# Patient Record
Sex: FEMALE | Race: BLACK OR AFRICAN AMERICAN | NOT HISPANIC OR LATINO
[De-identification: names, ages, dates, MRNs, and addresses within clinical notes are randomized per-mention and may not be internally consistent; named-entity substitution may affect disease eponyms.]

---

## 2017-09-05 ENCOUNTER — APPOINTMENT (OUTPATIENT)
Dept: OBGYN | Facility: CLINIC | Age: 22
End: 2017-09-05

## 2017-09-12 ENCOUNTER — APPOINTMENT (OUTPATIENT)
Dept: OBGYN | Facility: CLINIC | Age: 22
End: 2017-09-12

## 2017-11-14 ENCOUNTER — APPOINTMENT (OUTPATIENT)
Dept: FAMILY MEDICINE | Facility: CLINIC | Age: 22
End: 2017-11-14

## 2018-01-25 ENCOUNTER — APPOINTMENT (OUTPATIENT)
Dept: FAMILY MEDICINE | Facility: CLINIC | Age: 23
End: 2018-01-25
Payer: MEDICAID

## 2018-01-25 VITALS
SYSTOLIC BLOOD PRESSURE: 110 MMHG | OXYGEN SATURATION: 99 % | DIASTOLIC BLOOD PRESSURE: 74 MMHG | HEART RATE: 97 BPM | HEIGHT: 71 IN | RESPIRATION RATE: 16 BRPM | BODY MASS INDEX: 41.02 KG/M2 | WEIGHT: 293 LBS

## 2018-01-25 DIAGNOSIS — M54.9 DORSALGIA, UNSPECIFIED: ICD-10-CM

## 2018-01-25 DIAGNOSIS — M25.531 PAIN IN RIGHT WRIST: ICD-10-CM

## 2018-01-25 DIAGNOSIS — G89.29 DORSALGIA, UNSPECIFIED: ICD-10-CM

## 2018-01-25 DIAGNOSIS — H61.22 IMPACTED CERUMEN, LEFT EAR: ICD-10-CM

## 2018-01-25 DIAGNOSIS — Z23 ENCOUNTER FOR IMMUNIZATION: ICD-10-CM

## 2018-01-25 DIAGNOSIS — M79.642 PAIN IN RIGHT HAND: ICD-10-CM

## 2018-01-25 DIAGNOSIS — M79.641 PAIN IN RIGHT HAND: ICD-10-CM

## 2018-01-25 DIAGNOSIS — M25.532 PAIN IN RIGHT WRIST: ICD-10-CM

## 2018-01-25 PROCEDURE — 99385 PREV VISIT NEW AGE 18-39: CPT | Mod: 25

## 2018-01-25 PROCEDURE — 90715 TDAP VACCINE 7 YRS/> IM: CPT

## 2018-01-25 PROCEDURE — 69209 REMOVE IMPACTED EAR WAX UNI: CPT | Mod: LT

## 2018-01-25 PROCEDURE — 90471 IMMUNIZATION ADMIN: CPT

## 2018-01-25 PROCEDURE — G0447 BEHAVIOR COUNSEL OBESITY 15M: CPT

## 2018-03-05 DIAGNOSIS — R76.8 OTHER SPECIFIED ABNORMAL IMMUNOLOGICAL FINDINGS IN SERUM: ICD-10-CM

## 2018-03-05 LAB
ALBUMIN SERPL ELPH-MCNC: 4.2 G/DL
ALP BLD-CCNC: 94 U/L
ALT SERPL-CCNC: 6 U/L
ANA PAT FLD IF-IMP: ABNORMAL
ANA SER IF-ACNC: ABNORMAL
ANION GAP SERPL CALC-SCNC: 12 MMOL/L
APPEARANCE: CLEAR
AST SERPL-CCNC: 21 U/L
BACTERIA: NEGATIVE
BASOPHILS # BLD AUTO: 0.02 K/UL
BASOPHILS NFR BLD AUTO: 0.4 %
BILIRUB SERPL-MCNC: 0.3 MG/DL
BILIRUBIN URINE: NEGATIVE
BLOOD URINE: NEGATIVE
BUN SERPL-MCNC: 10 MG/DL
CALCIUM SERPL-MCNC: 9.8 MG/DL
CCP AB SER IA-ACNC: <8 UNITS
CHLORIDE SERPL-SCNC: 102 MMOL/L
CHOLEST SERPL-MCNC: 154 MG/DL
CHOLEST/HDLC SERPL: 3.5 RATIO
CO2 SERPL-SCNC: 23 MMOL/L
COLOR: YELLOW
CREAT SERPL-MCNC: 0.92 MG/DL
EOSINOPHIL # BLD AUTO: 0.03 K/UL
EOSINOPHIL NFR BLD AUTO: 0.6 %
GLUCOSE QUALITATIVE U: NEGATIVE MG/DL
GLUCOSE SERPL-MCNC: 84 MG/DL
HBA1C MFR BLD HPLC: 5.2 %
HCT VFR BLD CALC: 37.3 %
HDLC SERPL-MCNC: 44 MG/DL
HGB BLD-MCNC: 11.8 G/DL
HYALINE CASTS: 0 /LPF
IMM GRANULOCYTES NFR BLD AUTO: 0 %
KETONES URINE: NEGATIVE
LDLC SERPL CALC-MCNC: 96 MG/DL
LEUKOCYTE ESTERASE URINE: NEGATIVE
LYMPHOCYTES # BLD AUTO: 1.95 K/UL
LYMPHOCYTES NFR BLD AUTO: 40.4 %
MAN DIFF?: NORMAL
MCHC RBC-ENTMCNC: 28 PG
MCHC RBC-ENTMCNC: 31.6 GM/DL
MCV RBC AUTO: 88.4 FL
MICROSCOPIC-UA: NORMAL
MONOCYTES # BLD AUTO: 0.38 K/UL
MONOCYTES NFR BLD AUTO: 7.9 %
NEUTROPHILS # BLD AUTO: 2.45 K/UL
NEUTROPHILS NFR BLD AUTO: 50.7 %
NITRITE URINE: NEGATIVE
PH URINE: 6
PLATELET # BLD AUTO: 358 K/UL
POTASSIUM SERPL-SCNC: 4.4 MMOL/L
PROT SERPL-MCNC: 8.6 G/DL
PROTEIN URINE: NEGATIVE MG/DL
RBC # BLD: 4.22 M/UL
RBC # FLD: 15.6 %
RED BLOOD CELLS URINE: 3 /HPF
RF+CCP IGG SER-IMP: NEGATIVE
RHEUMATOID FACT SER QL: <7 IU/ML
SODIUM SERPL-SCNC: 137 MMOL/L
SPECIFIC GRAVITY URINE: 1.02
SQUAMOUS EPITHELIAL CELLS: 3 /HPF
T4 FREE SERPL-MCNC: 1 NG/DL
TRIGL SERPL-MCNC: 71 MG/DL
TSH SERPL-ACNC: 3.51 UIU/ML
UROBILINOGEN URINE: NEGATIVE MG/DL
WBC # FLD AUTO: 4.83 K/UL
WHITE BLOOD CELLS URINE: 2 /HPF

## 2018-04-23 ENCOUNTER — NON-APPOINTMENT (OUTPATIENT)
Age: 23
End: 2018-04-23

## 2018-04-23 ENCOUNTER — APPOINTMENT (OUTPATIENT)
Dept: FAMILY MEDICINE | Facility: CLINIC | Age: 23
End: 2018-04-23
Payer: MEDICAID

## 2018-04-23 VITALS
WEIGHT: 293 LBS | DIASTOLIC BLOOD PRESSURE: 80 MMHG | BODY MASS INDEX: 41.02 KG/M2 | SYSTOLIC BLOOD PRESSURE: 120 MMHG | HEIGHT: 71 IN

## 2018-04-23 DIAGNOSIS — M79.671 PAIN IN RIGHT LEG: ICD-10-CM

## 2018-04-23 DIAGNOSIS — M79.605 PAIN IN RIGHT LEG: ICD-10-CM

## 2018-04-23 DIAGNOSIS — M79.672 PAIN IN RIGHT LEG: ICD-10-CM

## 2018-04-23 DIAGNOSIS — M79.604 PAIN IN RIGHT LEG: ICD-10-CM

## 2018-04-23 PROCEDURE — 99214 OFFICE O/P EST MOD 30 MIN: CPT | Mod: 25

## 2018-04-23 PROCEDURE — 93000 ELECTROCARDIOGRAM COMPLETE: CPT

## 2018-04-23 PROCEDURE — G0447 BEHAVIOR COUNSEL OBESITY 15M: CPT

## 2018-05-01 ENCOUNTER — APPOINTMENT (OUTPATIENT)
Dept: OBGYN | Facility: CLINIC | Age: 23
End: 2018-05-01
Payer: MEDICAID

## 2018-05-01 VITALS
DIASTOLIC BLOOD PRESSURE: 80 MMHG | HEIGHT: 71 IN | SYSTOLIC BLOOD PRESSURE: 118 MMHG | BODY MASS INDEX: 41.02 KG/M2 | WEIGHT: 293 LBS

## 2018-05-01 DIAGNOSIS — Z82.49 FAMILY HISTORY OF ISCHEMIC HEART DISEASE AND OTHER DISEASES OF THE CIRCULATORY SYSTEM: ICD-10-CM

## 2018-05-01 DIAGNOSIS — Z30.09 ENCOUNTER FOR OTHER GENERAL COUNSELING AND ADVICE ON CONTRACEPTION: ICD-10-CM

## 2018-05-01 DIAGNOSIS — Z86.79 PERSONAL HISTORY OF OTHER DISEASES OF THE CIRCULATORY SYSTEM: ICD-10-CM

## 2018-05-01 DIAGNOSIS — Z83.3 FAMILY HISTORY OF DIABETES MELLITUS: ICD-10-CM

## 2018-05-01 DIAGNOSIS — Z83.1 FAMILY HISTORY OF OTHER INFECTIOUS AND PARASITIC DISEASES: ICD-10-CM

## 2018-05-01 DIAGNOSIS — Z01.419 ENCOUNTER FOR GYNECOLOGICAL EXAMINATION (GENERAL) (ROUTINE) W/OUT ABNORMAL FINDINGS: ICD-10-CM

## 2018-05-01 LAB
HCG UR QL: NEGATIVE
QUALITY CONTROL: YES

## 2018-05-01 PROCEDURE — 81025 URINE PREGNANCY TEST: CPT

## 2018-05-01 PROCEDURE — 99385 PREV VISIT NEW AGE 18-39: CPT

## 2018-05-24 ENCOUNTER — APPOINTMENT (OUTPATIENT)
Dept: FAMILY MEDICINE | Facility: CLINIC | Age: 23
End: 2018-05-24

## 2018-05-24 ENCOUNTER — APPOINTMENT (OUTPATIENT)
Dept: RHEUMATOLOGY | Facility: CLINIC | Age: 23
End: 2018-05-24

## 2018-09-01 ENCOUNTER — OUTPATIENT (OUTPATIENT)
Dept: OUTPATIENT SERVICES | Facility: HOSPITAL | Age: 23
LOS: 1 days | End: 2018-09-01
Payer: MEDICAID

## 2018-09-12 ENCOUNTER — EMERGENCY (EMERGENCY)
Facility: HOSPITAL | Age: 23
LOS: 1 days | Discharge: HOME | End: 2018-09-12
Attending: EMERGENCY MEDICINE
Payer: COMMERCIAL

## 2018-09-12 VITALS
HEART RATE: 77 BPM | TEMPERATURE: 99 F | RESPIRATION RATE: 18 BRPM | OXYGEN SATURATION: 98 % | DIASTOLIC BLOOD PRESSURE: 74 MMHG | SYSTOLIC BLOOD PRESSURE: 110 MMHG

## 2018-09-12 VITALS
SYSTOLIC BLOOD PRESSURE: 151 MMHG | RESPIRATION RATE: 18 BRPM | OXYGEN SATURATION: 97 % | HEIGHT: 72 IN | WEIGHT: 293 LBS | DIASTOLIC BLOOD PRESSURE: 86 MMHG | TEMPERATURE: 99 F | HEART RATE: 82 BPM

## 2018-09-12 LAB
ALBUMIN SERPL ELPH-MCNC: 4 G/DL — SIGNIFICANT CHANGE UP (ref 3.3–5.2)
ALP SERPL-CCNC: 78 U/L — SIGNIFICANT CHANGE UP (ref 40–120)
ALT FLD-CCNC: 15 U/L — SIGNIFICANT CHANGE UP
ANION GAP SERPL CALC-SCNC: 12 MMOL/L — SIGNIFICANT CHANGE UP (ref 5–17)
APPEARANCE UR: ABNORMAL
APTT BLD: 25.7 SEC — LOW (ref 27.5–37.4)
AST SERPL-CCNC: 17 U/L — SIGNIFICANT CHANGE UP
BACTERIA # UR AUTO: ABNORMAL
BASOPHILS # BLD AUTO: 0 K/UL — SIGNIFICANT CHANGE UP (ref 0–0.2)
BASOPHILS NFR BLD AUTO: 0.1 % — SIGNIFICANT CHANGE UP (ref 0–2)
BILIRUB SERPL-MCNC: 0.2 MG/DL — LOW (ref 0.4–2)
BILIRUB UR-MCNC: NEGATIVE — SIGNIFICANT CHANGE UP
BUN SERPL-MCNC: 9 MG/DL — SIGNIFICANT CHANGE UP (ref 8–20)
CALCIUM SERPL-MCNC: 9.4 MG/DL — SIGNIFICANT CHANGE UP (ref 8.6–10.2)
CHLORIDE SERPL-SCNC: 102 MMOL/L — SIGNIFICANT CHANGE UP (ref 98–107)
CO2 SERPL-SCNC: 25 MMOL/L — SIGNIFICANT CHANGE UP (ref 22–29)
COLOR SPEC: ABNORMAL
CREAT SERPL-MCNC: 0.78 MG/DL — SIGNIFICANT CHANGE UP (ref 0.5–1.3)
DIFF PNL FLD: ABNORMAL
EOSINOPHIL # BLD AUTO: 0 K/UL — SIGNIFICANT CHANGE UP (ref 0–0.5)
EOSINOPHIL NFR BLD AUTO: 0.3 % — SIGNIFICANT CHANGE UP (ref 0–6)
EPI CELLS # UR: SIGNIFICANT CHANGE UP
GLUCOSE SERPL-MCNC: 117 MG/DL — HIGH (ref 70–115)
GLUCOSE UR QL: NEGATIVE MG/DL — SIGNIFICANT CHANGE UP
HCG SERPL-ACNC: <5 MIU/ML — SIGNIFICANT CHANGE UP
HCG UR QL: NEGATIVE — SIGNIFICANT CHANGE UP
HCT VFR BLD CALC: 37.4 % — SIGNIFICANT CHANGE UP (ref 37–47)
HGB BLD-MCNC: 11.7 G/DL — LOW (ref 12–16)
INR BLD: 1.04 RATIO — SIGNIFICANT CHANGE UP (ref 0.88–1.16)
KETONES UR-MCNC: NEGATIVE — SIGNIFICANT CHANGE UP
LEUKOCYTE ESTERASE UR-ACNC: ABNORMAL
LIDOCAIN IGE QN: 30 U/L — SIGNIFICANT CHANGE UP (ref 22–51)
LYMPHOCYTES # BLD AUTO: 1.4 K/UL — SIGNIFICANT CHANGE UP (ref 1–4.8)
LYMPHOCYTES # BLD AUTO: 15 % — LOW (ref 20–55)
MCHC RBC-ENTMCNC: 28.9 PG — SIGNIFICANT CHANGE UP (ref 27–31)
MCHC RBC-ENTMCNC: 31.3 G/DL — LOW (ref 32–36)
MCV RBC AUTO: 92.3 FL — SIGNIFICANT CHANGE UP (ref 81–99)
MONOCYTES # BLD AUTO: 0.5 K/UL — SIGNIFICANT CHANGE UP (ref 0–0.8)
MONOCYTES NFR BLD AUTO: 5.8 % — SIGNIFICANT CHANGE UP (ref 3–10)
NEUTROPHILS # BLD AUTO: 7.2 K/UL — SIGNIFICANT CHANGE UP (ref 1.8–8)
NEUTROPHILS NFR BLD AUTO: 78.2 % — HIGH (ref 37–73)
NITRITE UR-MCNC: NEGATIVE — SIGNIFICANT CHANGE UP
PH UR: 8 — SIGNIFICANT CHANGE UP (ref 5–8)
PLATELET # BLD AUTO: 290 K/UL — SIGNIFICANT CHANGE UP (ref 150–400)
POTASSIUM SERPL-MCNC: 4.3 MMOL/L — SIGNIFICANT CHANGE UP (ref 3.5–5.3)
POTASSIUM SERPL-SCNC: 4.3 MMOL/L — SIGNIFICANT CHANGE UP (ref 3.5–5.3)
PROT SERPL-MCNC: 7.9 G/DL — SIGNIFICANT CHANGE UP (ref 6.6–8.7)
PROT UR-MCNC: 30 MG/DL
PROTHROM AB SERPL-ACNC: 11.5 SEC — SIGNIFICANT CHANGE UP (ref 9.8–12.7)
RBC # BLD: 4.05 M/UL — LOW (ref 4.4–5.2)
RBC # FLD: 13.3 % — SIGNIFICANT CHANGE UP (ref 11–15.6)
RBC CASTS # UR COMP ASSIST: SIGNIFICANT CHANGE UP /HPF (ref 0–4)
SODIUM SERPL-SCNC: 139 MMOL/L — SIGNIFICANT CHANGE UP (ref 135–145)
SP GR SPEC: 1.01 — SIGNIFICANT CHANGE UP (ref 1.01–1.02)
UROBILINOGEN FLD QL: NEGATIVE MG/DL — SIGNIFICANT CHANGE UP
WBC # BLD: 9.3 K/UL — SIGNIFICANT CHANGE UP (ref 4.8–10.8)
WBC # FLD AUTO: 9.3 K/UL — SIGNIFICANT CHANGE UP (ref 4.8–10.8)
WBC UR QL: SIGNIFICANT CHANGE UP

## 2018-09-12 PROCEDURE — 85027 COMPLETE CBC AUTOMATED: CPT

## 2018-09-12 PROCEDURE — 76856 US EXAM PELVIC COMPLETE: CPT | Mod: 26

## 2018-09-12 PROCEDURE — 96361 HYDRATE IV INFUSION ADD-ON: CPT

## 2018-09-12 PROCEDURE — 99284 EMERGENCY DEPT VISIT MOD MDM: CPT

## 2018-09-12 PROCEDURE — 85610 PROTHROMBIN TIME: CPT

## 2018-09-12 PROCEDURE — 36415 COLL VENOUS BLD VENIPUNCTURE: CPT

## 2018-09-12 PROCEDURE — 96374 THER/PROPH/DIAG INJ IV PUSH: CPT

## 2018-09-12 PROCEDURE — 83690 ASSAY OF LIPASE: CPT

## 2018-09-12 PROCEDURE — 87086 URINE CULTURE/COLONY COUNT: CPT

## 2018-09-12 PROCEDURE — 80053 COMPREHEN METABOLIC PANEL: CPT

## 2018-09-12 PROCEDURE — 76856 US EXAM PELVIC COMPLETE: CPT

## 2018-09-12 PROCEDURE — 84702 CHORIONIC GONADOTROPIN TEST: CPT

## 2018-09-12 PROCEDURE — 99284 EMERGENCY DEPT VISIT MOD MDM: CPT | Mod: 25

## 2018-09-12 PROCEDURE — 85730 THROMBOPLASTIN TIME PARTIAL: CPT

## 2018-09-12 PROCEDURE — 81025 URINE PREGNANCY TEST: CPT

## 2018-09-12 PROCEDURE — 81001 URINALYSIS AUTO W/SCOPE: CPT

## 2018-09-12 RX ORDER — METFORMIN HYDROCHLORIDE 850 MG/1
0 TABLET ORAL
Qty: 0 | Refills: 0 | COMMUNITY

## 2018-09-12 RX ORDER — NITROFURANTOIN MACROCRYSTAL 50 MG
100 CAPSULE ORAL ONCE
Qty: 0 | Refills: 0 | Status: COMPLETED | OUTPATIENT
Start: 2018-09-12 | End: 2018-09-12

## 2018-09-12 RX ORDER — ACETAMINOPHEN 500 MG
975 TABLET ORAL ONCE
Qty: 0 | Refills: 0 | Status: DISCONTINUED | OUTPATIENT
Start: 2018-09-12 | End: 2018-09-12

## 2018-09-12 RX ORDER — SODIUM CHLORIDE 9 MG/ML
1000 INJECTION INTRAMUSCULAR; INTRAVENOUS; SUBCUTANEOUS ONCE
Qty: 0 | Refills: 0 | Status: COMPLETED | OUTPATIENT
Start: 2018-09-12 | End: 2018-09-12

## 2018-09-12 RX ORDER — ACETAMINOPHEN 500 MG
1000 TABLET ORAL ONCE
Qty: 0 | Refills: 0 | Status: COMPLETED | OUTPATIENT
Start: 2018-09-12 | End: 2018-09-12

## 2018-09-12 RX ORDER — NITROFURANTOIN MACROCRYSTAL 50 MG
1 CAPSULE ORAL
Qty: 6 | Refills: 0 | OUTPATIENT
Start: 2018-09-12 | End: 2018-09-14

## 2018-09-12 RX ORDER — FERROUS SULFATE 325(65) MG
0 TABLET ORAL
Qty: 0 | Refills: 0 | COMMUNITY

## 2018-09-12 RX ADMIN — SODIUM CHLORIDE 4000 MILLILITER(S): 9 INJECTION INTRAMUSCULAR; INTRAVENOUS; SUBCUTANEOUS at 09:47

## 2018-09-12 RX ADMIN — Medication 1000 MILLIGRAM(S): at 12:35

## 2018-09-12 RX ADMIN — Medication 400 MILLIGRAM(S): at 12:00

## 2018-09-12 RX ADMIN — SODIUM CHLORIDE 1000 MILLILITER(S): 9 INJECTION INTRAMUSCULAR; INTRAVENOUS; SUBCUTANEOUS at 11:34

## 2018-09-12 RX ADMIN — Medication 100 MILLIGRAM(S): at 15:40

## 2018-09-12 RX ADMIN — SODIUM CHLORIDE 1000 MILLILITER(S): 9 INJECTION INTRAMUSCULAR; INTRAVENOUS; SUBCUTANEOUS at 15:37

## 2018-09-12 RX ADMIN — SODIUM CHLORIDE 2000 MILLILITER(S): 9 INJECTION INTRAMUSCULAR; INTRAVENOUS; SUBCUTANEOUS at 13:52

## 2018-09-12 NOTE — ED ADULT NURSE NOTE - OBJECTIVE STATEMENT
pt AOX4 c/o vaginal bleeding for 2 month, pt states her period has been very heavy and longer than expected, pt is saying she is saturating 1 pad in 1 hour.

## 2018-09-12 NOTE — ED PROVIDER NOTE - PROGRESS NOTE DETAILS
US limited due to body habitus, unable to perform TV patient never sexually active. no obvious mass or cysts. pain is b/l and diffuse, unlikely torsion without being unilateral and a normal US. pain could be related to diarrhea, will tx sx and reasses -Slowey DO patient feeling much better, pain completely resolved. waiting for urine sample -Salvador DO significant hematuria likely related to uterine bleeding. with symptoms of UTI will tx with macrobid -Salvador DO

## 2018-09-12 NOTE — ED PROVIDER NOTE - OBJECTIVE STATEMENT
23yo F with lower abd pain since last night, unable to describe but says yes to 'sharp, cramping, burning' constant. nonradiating. has had period x2 months following with Dr. Mays? had normal US. denies pregnancy, told has PCOS. no back pain. +nausea. no vomiting. +diarrhea 3times a day loose x1 week with crampy abd pain. no recent travel or abx us. +dysuria/frequency    PMH- DM 21yo F with lower abd pain since last night, unable to describe but says yes to 'sharp, cramping, burning' constant. nonradiating. has had period x2 months following with Dr. Mays? had normal US. denies pregnancy/sexual activity, has PCOS. no back pain. +nausea. no vomiting. +diarrhea 3times a day loose x1 week with crampy abd pain. no recent travel or abx us. +dysuria/frequency    PMH- DM

## 2018-09-12 NOTE — ED PROVIDER NOTE - MEDICAL DECISION MAKING DETAILS
patient with dysfunctional uterine bleeding h/o PCOS with worsening lower abd pain- will r/o torsion with US. pelvic exam. denies pregnancy will r/o make sure no ectopic concern. patient also with multiple ROS + including stating yes to diarrhea and constipation, unclear if truly having either symptoms. abd exam- not peritoneal no concern for acute surgical pathology. with diffuse pain no concern for appy. UTI possible- will check UA. patient with dysfunctional uterine bleeding h/o PCOS with worsening lower abd pain- will r/o torsion with US. pelvic exam. denies sexual activity, will still get HCG. patient also with multiple ROS + including stating yes to diarrhea and constipation, unclear if truly having either symptoms. abd exam- not peritoneal no concern for acute surgical pathology. with diffuse pain no concern for appy. UTI possible- will check UA.

## 2018-09-12 NOTE — ED ADULT NURSE REASSESSMENT NOTE - NS ED NURSE REASSESS COMMENT FT1
pt in no apparent distress, pt c/o pain 4/10 after pain medication. plan of care explained to pt, pt verbalized understanding.

## 2018-09-12 NOTE — ED PROVIDER NOTE - PLAN OF CARE
1. Return to ED for worsening, progressive or any other concerning symptoms   2. Follow up with your primary care doctor in 2-3days   3. Take 100mg of macrobid twice a day for 3 days

## 2018-09-12 NOTE — ED PROVIDER NOTE - PHYSICAL EXAMINATION
Gen: NAD, AOx3, morbidly obese  Head: NCAT  HEENT: PERRL, oral mucosa moist, normal conjunctiva, neck supple  Lung: CTAB, no respiratory distress  CV: rrr, no murmur, Normal perfusion  Abd: soft, diffuse lower abd ttp, no rebound/guarding, ND, no CVA tenderness  MSK: +2 b/l LE symmetric edema, no visible deformities  Neuro: No focal neurologic deficits  Skin: No rash   Psych: normal affect

## 2018-09-12 NOTE — ED PROVIDER NOTE - NS ED ROS FT
ROS: no CP/SOB. no cough. no fever. +n/v/d +abd pain. no rash. +bleeding. +urinary complaints. no weakness. no vision changes. no HA. no neck/back pain. no extremity swelling/deformity. No change in mental status.

## 2018-09-12 NOTE — ED PROVIDER NOTE - CARE PLAN
Principal Discharge DX:	Dysfunctional uterine bleeding  Assessment and plan of treatment:	1. Return to ED for worsening, progressive or any other concerning symptoms   2. Follow up with your primary care doctor in 2-3days   3. Take 100mg of macrobid twice a day for 3 days  Secondary Diagnosis:	Urinary tract infection

## 2018-09-13 DIAGNOSIS — Z71.89 OTHER SPECIFIED COUNSELING: ICD-10-CM

## 2018-09-13 LAB
CULTURE RESULTS: NO GROWTH — SIGNIFICANT CHANGE UP
SPECIMEN SOURCE: SIGNIFICANT CHANGE UP

## 2018-09-17 DIAGNOSIS — Z76.89 PERSONS ENCOUNTERING HEALTH SERVICES IN OTHER SPECIFIED CIRCUMSTANCES: ICD-10-CM

## 2018-10-31 PROBLEM — D64.9 ANEMIA, UNSPECIFIED: Chronic | Status: ACTIVE | Noted: 2018-09-12

## 2018-10-31 PROBLEM — E11.9 TYPE 2 DIABETES MELLITUS WITHOUT COMPLICATIONS: Chronic | Status: ACTIVE | Noted: 2018-09-12

## 2018-11-05 ENCOUNTER — APPOINTMENT (OUTPATIENT)
Dept: FAMILY MEDICINE | Facility: CLINIC | Age: 23
End: 2018-11-05
Payer: MEDICAID

## 2018-11-05 VITALS
SYSTOLIC BLOOD PRESSURE: 120 MMHG | BODY MASS INDEX: 39.68 KG/M2 | DIASTOLIC BLOOD PRESSURE: 80 MMHG | WEIGHT: 293 LBS | HEART RATE: 73 BPM | HEIGHT: 72 IN | OXYGEN SATURATION: 99 %

## 2018-11-05 PROCEDURE — 90686 IIV4 VACC NO PRSV 0.5 ML IM: CPT

## 2018-11-05 PROCEDURE — G0008: CPT

## 2018-11-05 PROCEDURE — 99214 OFFICE O/P EST MOD 30 MIN: CPT | Mod: 25

## 2018-11-05 NOTE — REVIEW OF SYSTEMS
[Muscle Pain] : muscle pain [Headache] : headache [Negative] : Psychiatric [FreeTextEntry4] : see hpi

## 2018-11-05 NOTE — ASSESSMENT
[FreeTextEntry1] : migraines - start fioricet prn, sumatriptan prn. if migraines become daily, rto may need daily prophylactic medication\par neck spasms - tizanidine prn. Possible adverse effects discussed with pt\par Risks and benefits of flu vaccine discussed w/ pt. Consent given by pt, flu vaccine administered. Pt tolerated well

## 2018-11-05 NOTE — HISTORY OF PRESENT ILLNESS
[FreeTextEntry8] : Pt c/o worsening headaches. Headaches are in frontal area. Pt starting having intermittent headaches since summer, then in past 2 weeks headaches worsened in severity. Headaches are taking longer to resolve now. Pt using advil or aleve prn which had helped headaches.Pain is nonradiating. Pt is sensitive to light and sounds during headaches. Denies fever, chills. Pt also c/o neck and shoulder pain.

## 2018-11-12 ENCOUNTER — RX RENEWAL (OUTPATIENT)
Age: 23
End: 2018-11-12

## 2019-01-18 ENCOUNTER — APPOINTMENT (OUTPATIENT)
Dept: FAMILY MEDICINE | Facility: CLINIC | Age: 24
End: 2019-01-18
Payer: MEDICAID

## 2019-01-18 VITALS
WEIGHT: 293 LBS | DIASTOLIC BLOOD PRESSURE: 62 MMHG | SYSTOLIC BLOOD PRESSURE: 110 MMHG | HEIGHT: 72 IN | BODY MASS INDEX: 39.68 KG/M2 | HEART RATE: 78 BPM | RESPIRATION RATE: 16 BRPM | OXYGEN SATURATION: 98 % | TEMPERATURE: 98.2 F

## 2019-01-18 DIAGNOSIS — D50.9 IRON DEFICIENCY ANEMIA, UNSPECIFIED: ICD-10-CM

## 2019-01-18 DIAGNOSIS — M62.838 OTHER MUSCLE SPASM: ICD-10-CM

## 2019-01-18 PROCEDURE — 99395 PREV VISIT EST AGE 18-39: CPT | Mod: 25

## 2019-01-18 PROCEDURE — G0447 BEHAVIOR COUNSEL OBESITY 15M: CPT

## 2019-01-20 RX ORDER — NITROFURANTOIN (MONOHYDRATE/MACROCRYSTALS) 25; 75 MG/1; MG/1
100 CAPSULE ORAL
Qty: 6 | Refills: 0 | Status: COMPLETED | COMMUNITY
Start: 2018-09-12

## 2019-01-20 RX ORDER — IBUPROFEN 800 MG/1
800 TABLET, FILM COATED ORAL
Qty: 30 | Refills: 0 | Status: COMPLETED | COMMUNITY
Start: 2018-07-14

## 2019-01-20 RX ORDER — NORGESTIMATE AND ETHINYL ESTRADIOL 0.25-0.035
0.25-35 KIT ORAL
Qty: 28 | Refills: 0 | Status: COMPLETED | COMMUNITY
Start: 2018-08-20

## 2019-01-20 RX ORDER — ERYTHROMYCIN 20 MG/G
2 GEL TOPICAL
Qty: 60 | Refills: 0 | Status: COMPLETED | COMMUNITY
Start: 2018-10-05

## 2019-01-20 RX ORDER — AMOXICILLIN 500 MG/1
500 CAPSULE ORAL
Qty: 20 | Refills: 0 | Status: COMPLETED | COMMUNITY
Start: 2019-01-02

## 2019-01-20 RX ORDER — ERGOCALCIFEROL 1.25 MG/1
1.25 MG CAPSULE, LIQUID FILLED ORAL
Qty: 4 | Refills: 0 | Status: COMPLETED | COMMUNITY
Start: 2018-09-05

## 2019-01-20 RX ORDER — NORETHINDRONE ACETATE AND ETHINYL ESTRADIOL AND FERROUS FUMARATE 1MG-20(21)
1-20 KIT ORAL
Qty: 28 | Refills: 0 | Status: ACTIVE | COMMUNITY
Start: 2019-01-07

## 2019-01-20 RX ORDER — METFORMIN ER 500 MG 500 MG/1
500 TABLET ORAL
Qty: 90 | Refills: 0 | Status: COMPLETED | COMMUNITY
Start: 2018-08-03

## 2019-01-20 NOTE — HISTORY OF PRESENT ILLNESS
[de-identified] : Pt in office for CPE. Pt had strep throat 2 weeks ago. Pt was given course of amoxicillin which helped sore throat resolve. Pt now c/o sinus pressure and pain x 10 days. Pt c/o chills usually at night. Pt took allegra which didn't help symptoms. Migraines have been better controlled. Denies CP, palpitations, dyspnea, n/v.\par Nonsmoker\par ETOH use denies\par Drug use denies\par Exercises irregularly cardio+lifting 2x a week\par Diet balanced, has been losing weight through portion control\par Works as  at [employer]\par Single. Sexually active 1 partner consistent w/ condom use. Pt to check if gardasil vaccine is covered by insurance.

## 2019-01-20 NOTE — HEALTH RISK ASSESSMENT
[Patient reported PAP Smear was normal] : Patient reported PAP Smear was normal [PapSmearDate] : 01/19

## 2019-01-20 NOTE — ASSESSMENT
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 Rest. Start course of azithromycin if no improvement in symptoms\par morbid obesity - Advised lifestyle modifications for wt loss. Cont healthy diet and regular exercise regimen discussed w/ pt for 15 minutes. start trial of phentermine\par neck spasms - tizanidine prn\par prediabetes - check hba1c\par hx fe defic anemia - check cbc, iron studies\par Healthy diet and regular exercise regimen discussed w/ pt.\par Screening labs ordered\par Advised routine pap smear\par Any questions call office

## 2019-01-22 LAB
ALBUMIN SERPL ELPH-MCNC: 4.2 G/DL
ALP BLD-CCNC: 75 U/L
ALT SERPL-CCNC: 10 U/L
ANION GAP SERPL CALC-SCNC: 11 MMOL/L
AST SERPL-CCNC: 22 U/L
BASOPHILS # BLD AUTO: 0.02 K/UL
BASOPHILS NFR BLD AUTO: 0.4 %
BILIRUB SERPL-MCNC: 0.3 MG/DL
BUN SERPL-MCNC: 7 MG/DL
CALCIUM SERPL-MCNC: 10.1 MG/DL
CHLORIDE SERPL-SCNC: 103 MMOL/L
CHOLEST SERPL-MCNC: 155 MG/DL
CHOLEST/HDLC SERPL: 3.3 RATIO
CO2 SERPL-SCNC: 24 MMOL/L
CREAT SERPL-MCNC: 0.95 MG/DL
EOSINOPHIL # BLD AUTO: 0.02 K/UL
EOSINOPHIL NFR BLD AUTO: 0.4 %
FERRITIN SERPL-MCNC: 43 NG/ML
GLUCOSE SERPL-MCNC: 68 MG/DL
HBA1C MFR BLD HPLC: 5.2 %
HCT VFR BLD CALC: 38.8 %
HDLC SERPL-MCNC: 47 MG/DL
HGB BLD-MCNC: 12 G/DL
IMM GRANULOCYTES NFR BLD AUTO: 0.2 %
IRON SATN MFR SERPL: 22 %
IRON SERPL-MCNC: 78 UG/DL
LDLC SERPL CALC-MCNC: 87 MG/DL
LYMPHOCYTES # BLD AUTO: 2.27 K/UL
LYMPHOCYTES NFR BLD AUTO: 42.3 %
MAN DIFF?: NORMAL
MCHC RBC-ENTMCNC: 28.8 PG
MCHC RBC-ENTMCNC: 30.9 GM/DL
MCV RBC AUTO: 93.3 FL
MONOCYTES # BLD AUTO: 0.44 K/UL
MONOCYTES NFR BLD AUTO: 8.2 %
NEUTROPHILS # BLD AUTO: 2.61 K/UL
NEUTROPHILS NFR BLD AUTO: 48.5 %
PLATELET # BLD AUTO: 374 K/UL
POTASSIUM SERPL-SCNC: 4.5 MMOL/L
PROT SERPL-MCNC: 8.4 G/DL
RBC # BLD: 4.16 M/UL
RBC # FLD: 14.4 %
SODIUM SERPL-SCNC: 138 MMOL/L
TIBC SERPL-MCNC: 355 UG/DL
TRIGL SERPL-MCNC: 105 MG/DL
UIBC SERPL-MCNC: 277 UG/DL
WBC # FLD AUTO: 5.37 K/UL

## 2019-02-17 ENCOUNTER — RX RENEWAL (OUTPATIENT)
Age: 24
End: 2019-02-17

## 2019-02-26 ENCOUNTER — TRANSCRIPTION ENCOUNTER (OUTPATIENT)
Age: 24
End: 2019-02-26

## 2019-09-27 ENCOUNTER — APPOINTMENT (OUTPATIENT)
Dept: FAMILY MEDICINE | Facility: CLINIC | Age: 24
End: 2019-09-27
Payer: MEDICAID

## 2019-09-27 VITALS
HEIGHT: 72 IN | DIASTOLIC BLOOD PRESSURE: 66 MMHG | SYSTOLIC BLOOD PRESSURE: 110 MMHG | RESPIRATION RATE: 16 BRPM | OXYGEN SATURATION: 98 % | WEIGHT: 293 LBS | HEART RATE: 83 BPM | BODY MASS INDEX: 39.68 KG/M2

## 2019-09-27 DIAGNOSIS — Z01.84 ENCOUNTER FOR ANTIBODY RESPONSE EXAMINATION: ICD-10-CM

## 2019-09-27 PROCEDURE — 86580 TB INTRADERMAL TEST: CPT

## 2019-09-27 PROCEDURE — 99213 OFFICE O/P EST LOW 20 MIN: CPT | Mod: 25

## 2019-09-27 PROCEDURE — G0008: CPT

## 2019-09-27 PROCEDURE — 90674 CCIIV4 VAC NO PRSV 0.5 ML IM: CPT

## 2019-09-27 PROCEDURE — 90746 HEPB VACCINE 3 DOSE ADULT IM: CPT

## 2019-09-27 PROCEDURE — 90472 IMMUNIZATION ADMIN EACH ADD: CPT

## 2019-09-27 RX ORDER — TIZANIDINE 4 MG/1
4 TABLET ORAL
Qty: 30 | Refills: 0 | Status: DISCONTINUED | COMMUNITY
Start: 2018-11-05 | End: 2019-09-27

## 2019-09-27 RX ORDER — BUTALBITAL, ACETAMINOPHEN AND CAFFEINE 300; 50; 40 MG/1; MG/1; MG/1
50-300-40 CAPSULE ORAL
Qty: 30 | Refills: 0 | Status: DISCONTINUED | COMMUNITY
Start: 2018-11-05 | End: 2019-09-27

## 2019-09-29 NOTE — HISTORY OF PRESENT ILLNESS
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

## 2019-09-29 NOTE — ASSESSMENT
[FreeTextEntry1] : Risks and benefits of flu vaccine discussed w/ pt. Consent given by pt, flu vaccine administered. Pt tolerated well\par PPD placed in left forearm. RTO in 48-72 hrs for read\par hep b vaccine administered, pt consent given before administration and after discussion of risks/benefits, pt tolerated well. 2nd and 3rd doses in 2 and 6 months.\par MMR titer ordered

## 2019-09-30 ENCOUNTER — APPOINTMENT (OUTPATIENT)
Dept: FAMILY MEDICINE | Facility: CLINIC | Age: 24
End: 2019-09-30

## 2019-10-01 LAB
MEV IGG FLD QL IA: 29.5 AU/ML
MEV IGG+IGM SER-IMP: POSITIVE
MUV AB SER-ACNC: POSITIVE
MUV IGG SER QL IA: 53.1 AU/ML
RUBV IGG FLD-ACNC: 1.5 INDEX
RUBV IGG SER-IMP: POSITIVE

## 2019-11-04 ENCOUNTER — APPOINTMENT (OUTPATIENT)
Dept: FAMILY MEDICINE | Facility: CLINIC | Age: 24
End: 2019-11-04
Payer: MEDICAID

## 2019-11-04 VITALS
SYSTOLIC BLOOD PRESSURE: 120 MMHG | HEART RATE: 88 BPM | DIASTOLIC BLOOD PRESSURE: 74 MMHG | OXYGEN SATURATION: 98 % | WEIGHT: 293 LBS | BODY MASS INDEX: 39.68 KG/M2 | HEIGHT: 72 IN

## 2019-11-04 DIAGNOSIS — R73.03 PREDIABETES.: ICD-10-CM

## 2019-11-04 PROCEDURE — 99214 OFFICE O/P EST MOD 30 MIN: CPT

## 2019-11-04 NOTE — HISTORY OF PRESENT ILLNESS
[FreeTextEntry8] : Pt dx with DM earlier this year, pt seeing endo Dr. Maradiaga q 3 months. Pt taking metformin 500mg TID as well as trulicity q week. Pt due for diabetic eye exam, last exam was 2018.\par \par Pt c/o cold symptoms x 4 days. Denies fever, chills. Dayquil/nyquil helps symptoms. Pt has scratchy throat w/ PND.\par Pt morbidly obese, has been losing weight w/ trulicity, exercising 3x a week and improved diet. Pt would also like to restart phentermine to help aid wt loss. Pt had tolerated med well in the past. Denies CP, palpitations, dyspnea, n/v

## 2019-11-04 NOTE — ASSESSMENT
[FreeTextEntry1] : DM- cont meds, refer to ophtho for eye exam\par URI - fluticasone bid prn\par morbid obesity - Advised lifestyle modifications for wt loss. Healthy diet and regular exercise regimen discussed w/ pt. restart phentermine q day. Possible adverse effects discussed with pt.  checked. f/u in 1-2 months for wt check

## 2019-12-10 ENCOUNTER — RX RENEWAL (OUTPATIENT)
Age: 24
End: 2019-12-10

## 2020-01-18 ENCOUNTER — EMERGENCY (EMERGENCY)
Facility: HOSPITAL | Age: 25
LOS: 1 days | Discharge: HOME | End: 2020-01-18
Attending: EMERGENCY MEDICINE
Payer: COMMERCIAL

## 2020-01-18 VITALS
HEIGHT: 72 IN | SYSTOLIC BLOOD PRESSURE: 137 MMHG | TEMPERATURE: 98 F | WEIGHT: 285.06 LBS | OXYGEN SATURATION: 99 % | HEART RATE: 93 BPM | RESPIRATION RATE: 16 BRPM | DIASTOLIC BLOOD PRESSURE: 89 MMHG

## 2020-01-18 PROCEDURE — 99283 EMERGENCY DEPT VISIT LOW MDM: CPT

## 2020-01-18 RX ORDER — FAMOTIDINE 10 MG/ML
20 INJECTION INTRAVENOUS ONCE
Refills: 0 | Status: COMPLETED | OUTPATIENT
Start: 2020-01-18 | End: 2020-01-18

## 2020-01-18 RX ORDER — CEPHALEXIN 500 MG
500 CAPSULE ORAL ONCE
Refills: 0 | Status: COMPLETED | OUTPATIENT
Start: 2020-01-18 | End: 2020-01-18

## 2020-01-18 RX ORDER — DIPHENHYDRAMINE HCL 50 MG
50 CAPSULE ORAL EVERY 4 HOURS
Refills: 0 | Status: DISCONTINUED | OUTPATIENT
Start: 2020-01-18 | End: 2020-02-04

## 2020-01-18 RX ORDER — CEPHALEXIN 500 MG
1 CAPSULE ORAL
Qty: 14 | Refills: 0
Start: 2020-01-18 | End: 2020-01-24

## 2020-01-18 RX ADMIN — FAMOTIDINE 20 MILLIGRAM(S): 10 INJECTION INTRAVENOUS at 08:31

## 2020-01-18 RX ADMIN — Medication 50 MILLIGRAM(S): at 08:30

## 2020-01-18 RX ADMIN — Medication 60 MILLIGRAM(S): at 08:31

## 2020-01-18 RX ADMIN — Medication 500 MILLIGRAM(S): at 11:24

## 2020-01-18 NOTE — ED PROVIDER NOTE - ATTENDING CONTRIBUTION TO CARE
Dr. Amato : I have personally seen and examined this patient at the bedside. I have fully participated in the care of this patient. I have reviewed all pertinent clinical information, including history, physical exam, plan and the PA's note and agree except as noted.     23 yo F hx of diabetes controlled on metformin and weekly Trulicity. states that this past week she has had puffiness to the eyes the entire week notice that the swelling got worse associated with itching. hx of eczema and has acquaphor in the past but notes never was this bad.    Denies f/c/n/v/cp/sob/palpitations/cough/abd.pain/d/c/dysuria/hematuria. no sick contacts/recent travel.    PE:  head; atraumatic normocephalic  eyes: perrla eomi . lid eversion without noted fb ; swelling bl to lower eyelid left >right no crepitus  Heart: rrr s1s2  lungs: ctab  abd: soft, nt nd + bs no rebound/guarding no cva ttp  le: no swelling no calf ttp  back: no midline cervical/thoracic/lumbar ttp      -->eczema vs allergic rnx will tx with benadryl steroids abx dc with derm follow up

## 2020-01-18 NOTE — ED PROVIDER NOTE - NSFOLLOWUPINSTRUCTIONS_ED_ALL_ED_FT
Rash    A rash is a change in the color of the skin. A rash can also change the way your skin feels. There are many different conditions and factors that can cause a rash, most of which are not dangerous. Make sure to follow up with your primary care physician or a dermatologist as instructed by your health care provider.    SEEK IMMEDIATE MEDICAL CARE IF YOU HAVE ANY OF THE FOLLOWING SYMPTOMS: fever, blisters, a rash inside your mouth, vaginal or anal pain, or altered mental status.      Allergic Reaction    An allergic reaction is an abnormal reaction to a substance (allergen) by the body's defense system. Common allergens include medicines, food, insect bites or stings, and blood products. The body releases certain proteins into the blood that can cause a variety of symptoms such as an itchy rash, wheezing, swelling of the face/lips/tongue/throat, abdominal pain, nausea or vomiting. An allergic reaction is usually treated with medication. If your health care provider prescribed you an epinephrine injection device, make sure to keep it with you at all times.    SEEK IMMEDIATE MEDICAL CARE IF YOU HAVE ANY OF THE FOLLOWING SYMPTOMS: allergic reaction severe enough that required you to use epinephrine, tightness in your chest, swelling around your lips/tongue/throat, abdominal pain, vomiting or diarrhea, or lightheadedness/dizziness. These symptoms may represent a serious problem that is an emergency. Do not wait to see if the symptoms will go away. Use your auto-injector pen or anaphylaxis kit as you have been instructed. Call 911 and do not drive yourself to the hospital.

## 2020-01-18 NOTE — ED PROVIDER NOTE - CARE PROVIDER_API CALL
Jennifer Berry)  Dermatology  [address] [place] [address]  Phone: [phone]  Fax: [phone]  Follow Up Time:

## 2020-01-18 NOTE — ED PROVIDER NOTE - CLINICAL SUMMARY MEDICAL DECISION MAKING FREE TEXT BOX
female with bilateral puffy eyes associated with itching.   allergy meds and re-eval keflex as coverage for potential bacterial introduction due to excoriations   dc with fu with dermatology

## 2020-01-18 NOTE — ED PROVIDER NOTE - OBJECTIVE STATEMENT
23 yo female hx of diabetes controlled on metformin and weekly Trulicity. states that this past week she has had puffiness to the eyes the entire week notice that the swelling got worse associated with fuzzy vision and associated itchiness. denies headache double vision or pain to the eyes. denies discharge or trauma to the eyes. state that she does wear eye glasses for everything no recent eye doctor apts, no new drops. states that she recently started using aquafor to the face this past week and has never used it before. no new shampoos or conditioners. states

## 2020-01-18 NOTE — ED PROVIDER NOTE - PATIENT PORTAL LINK FT
You can access the FollowMyHealth Patient Portal offered by [facility] by registering at the following website: [email or web address]. By joining [employer]’s FollowMyHealth portal, you will also be able to view your health information using other applications (apps) compatible with our system.

## 2020-01-18 NOTE — ED PROVIDER NOTE - PHYSICAL EXAMINATION
EYES melvin eomi without pain. lid eversion without noted fb   bilateral periorbital edema bilaterally worse left vs right sided. no palpable crepitus. abrasion to the left lower orbit mildly tender to palpate.

## 2020-01-18 NOTE — ED ADULT TRIAGE NOTE - CHIEF COMPLAINT QUOTE
Patient comes to ED from home for eye swelling this morning; reports redness and pain to left eye yesterday.

## 2020-01-29 ENCOUNTER — RX RENEWAL (OUTPATIENT)
Age: 25
End: 2020-01-29

## 2020-02-01 PROCEDURE — G9005: CPT

## 2020-02-24 ENCOUNTER — APPOINTMENT (OUTPATIENT)
Dept: FAMILY MEDICINE | Facility: CLINIC | Age: 25
End: 2020-02-24
Payer: MEDICAID

## 2020-02-24 VITALS
HEIGHT: 72 IN | WEIGHT: 293 LBS | BODY MASS INDEX: 39.68 KG/M2 | HEART RATE: 95 BPM | DIASTOLIC BLOOD PRESSURE: 74 MMHG | OXYGEN SATURATION: 99 % | SYSTOLIC BLOOD PRESSURE: 120 MMHG

## 2020-02-24 DIAGNOSIS — Z23 ENCOUNTER FOR IMMUNIZATION: ICD-10-CM

## 2020-02-24 DIAGNOSIS — N39.0 URINARY TRACT INFECTION, SITE NOT SPECIFIED: ICD-10-CM

## 2020-02-24 LAB
BILIRUB UR QL STRIP: NEGATIVE
GLUCOSE UR-MCNC: NEGATIVE
HCG UR QL: 0.2 EU/DL
HGB UR QL STRIP.AUTO: NORMAL
KETONES UR-MCNC: NEGATIVE
LEUKOCYTE ESTERASE UR QL STRIP: NORMAL
NITRITE UR QL STRIP: POSITIVE
PH UR STRIP: 6.5
PROT UR STRIP-MCNC: NORMAL
SP GR UR STRIP: >=1.03

## 2020-02-24 PROCEDURE — 81003 URINALYSIS AUTO W/O SCOPE: CPT | Mod: QW

## 2020-02-24 PROCEDURE — 90472 IMMUNIZATION ADMIN EACH ADD: CPT

## 2020-02-24 PROCEDURE — 90746 HEPB VACCINE 3 DOSE ADULT IM: CPT

## 2020-02-24 PROCEDURE — 90471 IMMUNIZATION ADMIN: CPT

## 2020-02-24 PROCEDURE — 99395 PREV VISIT EST AGE 18-39: CPT | Mod: 25

## 2020-02-24 PROCEDURE — 90651 9VHPV VACCINE 2/3 DOSE IM: CPT

## 2020-02-24 RX ORDER — AZITHROMYCIN 250 MG/1
250 TABLET, FILM COATED ORAL
Qty: 1 | Refills: 0 | Status: COMPLETED | COMMUNITY
Start: 2019-01-18 | End: 2020-02-24

## 2020-02-26 NOTE — HEALTH RISK ASSESSMENT
[Patient reported PAP Smear was normal] : Patient reported PAP Smear was normal [PapSmearDate] : 02/20

## 2020-02-26 NOTE — HISTORY OF PRESENT ILLNESS
[de-identified] : Pt in office for CPE.\par Pt c/o feeling fatigue and tired for past 3 weeks. Pt does report she snores often and has daytime somnolence. Pt has never had a sleep study and is morbidly obese.\par Pt c/o lower abdominal pain x 5 days. Pain occurs randomly throughout day. OTC ibuprofen help relieve pain. Pain is described as sharp and radiating into back at times. Pt denies having had similar pain in the past. Pt also c/o inc urinary frequency. \par Pt sees Dr. Eduard Maradiaga q 3 months for DM, pt on metformin and trulicity. Last hba1c 5.\par Migraines have been better controlled. 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 Sexually active 1 partner consistent w/ condom use. Pt requests STD testing. Pt to check if gardasil vaccine is covered by insurance

## 2020-02-27 LAB — BACTERIA UR CULT: ABNORMAL

## 2020-03-13 ENCOUNTER — APPOINTMENT (OUTPATIENT)
Dept: FAMILY MEDICINE | Facility: CLINIC | Age: 25
End: 2020-03-13
Payer: MEDICAID

## 2020-03-13 VITALS
DIASTOLIC BLOOD PRESSURE: 78 MMHG | SYSTOLIC BLOOD PRESSURE: 124 MMHG | HEIGHT: 72 IN | OXYGEN SATURATION: 99 % | WEIGHT: 293 LBS | BODY MASS INDEX: 39.68 KG/M2 | HEART RATE: 90 BPM | RESPIRATION RATE: 16 BRPM

## 2020-03-13 DIAGNOSIS — Z02.83 ENCOUNTER FOR BLOOD-ALCOHOL AND BLOOD-DRUG TEST: ICD-10-CM

## 2020-03-13 PROCEDURE — 99213 OFFICE O/P EST LOW 20 MIN: CPT

## 2020-03-13 NOTE — HISTORY OF PRESENT ILLNESS
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 Pt needs urine drug screen. Pt denies any hx of drug abuse.\par Pt also would like to add on STD panel to her labs as well.

## 2020-03-16 LAB
ALBUMIN SERPL ELPH-MCNC: 4.2 G/DL
ALP BLD-CCNC: 84 U/L
ALT SERPL-CCNC: 20 U/L
ANION GAP SERPL CALC-SCNC: 13 MMOL/L
AST SERPL-CCNC: 16 U/L
BASOPHILS # BLD AUTO: 0.02 K/UL
BASOPHILS NFR BLD AUTO: 0.4 %
BILIRUB SERPL-MCNC: 0.2 MG/DL
BUN SERPL-MCNC: 12 MG/DL
C TRACH RRNA SPEC QL NAA+PROBE: NOT DETECTED
CALCIUM SERPL-MCNC: 9.4 MG/DL
CHLORIDE SERPL-SCNC: 103 MMOL/L
CHOLEST SERPL-MCNC: 156 MG/DL
CHOLEST/HDLC SERPL: 3 RATIO
CO2 SERPL-SCNC: 25 MMOL/L
CREAT SERPL-MCNC: 0.95 MG/DL
EOSINOPHIL # BLD AUTO: 0.02 K/UL
EOSINOPHIL NFR BLD AUTO: 0.4 %
ESTIMATED AVERAGE GLUCOSE: 85 MG/DL
FOLATE SERPL-MCNC: >20 NG/ML
GLUCOSE SERPL-MCNC: 83 MG/DL
HBA1C MFR BLD HPLC: 4.6 %
HCT VFR BLD CALC: 38.9 %
HDLC SERPL-MCNC: 51 MG/DL
HGB BLD-MCNC: 11.9 G/DL
HIV1+2 AB SPEC QL IA.RAPID: NONREACTIVE
IMM GRANULOCYTES NFR BLD AUTO: 0.4 %
LDLC SERPL CALC-MCNC: 73 MG/DL
LYMPHOCYTES # BLD AUTO: 2.42 K/UL
LYMPHOCYTES NFR BLD AUTO: 42.7 %
MAN DIFF?: NORMAL
MCHC RBC-ENTMCNC: 30.6 GM/DL
MCHC RBC-ENTMCNC: 30.7 PG
MCV RBC AUTO: 100.3 FL
MONOCYTES # BLD AUTO: 0.29 K/UL
MONOCYTES NFR BLD AUTO: 5.1 %
N GONORRHOEA RRNA SPEC QL NAA+PROBE: NOT DETECTED
NEUTROPHILS # BLD AUTO: 2.9 K/UL
NEUTROPHILS NFR BLD AUTO: 51 %
PLATELET # BLD AUTO: 340 K/UL
POTASSIUM SERPL-SCNC: 4.2 MMOL/L
PROT SERPL-MCNC: 7.6 G/DL
RBC # BLD: 3.88 M/UL
RBC # FLD: 12.6 %
SODIUM SERPL-SCNC: 141 MMOL/L
SOURCE AMPLIFICATION: NORMAL
T PALLIDUM AB SER QL IA: NEGATIVE
T4 FREE SERPL-MCNC: 1 NG/DL
TRIGL SERPL-MCNC: 159 MG/DL
TSH SERPL-ACNC: 3.51 UIU/ML
VIT B12 SERPL-MCNC: 426 PG/ML
WBC # FLD AUTO: 5.67 K/UL

## 2020-03-17 ENCOUNTER — RX RENEWAL (OUTPATIENT)
Age: 25
End: 2020-03-17

## 2020-03-19 LAB
AMPHET UR-MCNC: NEGATIVE
BARBITURATES UR-MCNC: NEGATIVE
BENZODIAZ UR-MCNC: NEGATIVE
COCAINE METAB.OTHER UR-MCNC: NEGATIVE
CREATININE, URINE: 219.9 MG/DL
METHADONE UR-MCNC: NEGATIVE
METHAQUALONE UR-MCNC: NEGATIVE
OPIATES UR-MCNC: NEGATIVE
PCP UR-MCNC: NEGATIVE
PROPOXYPH UR QL: NEGATIVE
THC UR QL: NEGATIVE

## 2020-03-24 ENCOUNTER — RX RENEWAL (OUTPATIENT)
Age: 25
End: 2020-03-24

## 2020-04-02 ENCOUNTER — NON-APPOINTMENT (OUTPATIENT)
Age: 25
End: 2020-04-02

## 2020-04-02 ENCOUNTER — APPOINTMENT (OUTPATIENT)
Dept: FAMILY MEDICINE | Facility: CLINIC | Age: 25
End: 2020-04-02
Payer: MEDICAID

## 2020-04-02 VITALS
BODY MASS INDEX: 39.68 KG/M2 | DIASTOLIC BLOOD PRESSURE: 80 MMHG | OXYGEN SATURATION: 98 % | HEART RATE: 94 BPM | WEIGHT: 293 LBS | SYSTOLIC BLOOD PRESSURE: 126 MMHG | HEIGHT: 72 IN

## 2020-04-02 DIAGNOSIS — R00.2 PALPITATIONS: ICD-10-CM

## 2020-04-02 DIAGNOSIS — Z91.89 OTHER SPECIFIED PERSONAL RISK FACTORS, NOT ELSEWHERE CLASSIFIED: ICD-10-CM

## 2020-04-02 DIAGNOSIS — H69.81 OTHER SPECIFIED DISORDERS OF EUSTACHIAN TUBE, RIGHT EAR: ICD-10-CM

## 2020-04-02 PROCEDURE — 99214 OFFICE O/P EST MOD 30 MIN: CPT | Mod: 25

## 2020-04-02 PROCEDURE — 93000 ELECTROCARDIOGRAM COMPLETE: CPT

## 2020-04-02 RX ORDER — PHENTERMINE HYDROCHLORIDE 15 MG/1
15 CAPSULE ORAL
Qty: 30 | Refills: 0 | Status: COMPLETED | COMMUNITY
Start: 2018-04-23 | End: 2020-04-02

## 2020-04-02 NOTE — ASSESSMENT
[FreeTextEntry1] : palpitations - start metoprolol. refer to cardio\par R eustachian tube dysfunction - flonase bid, claritin q day\par hx anaphylaxis - refill epipen\par

## 2020-04-02 NOTE — HISTORY OF PRESENT ILLNESS
[FreeTextEntry8] : Pt c/o palpitations and chest pressure intermittently for several weeks. Palpitations worse with moderate to heavy exercise also associated with feelings of lightheadedness. Othre times symptoms appear randomly such as when waking up. Symptoms last for several hrs then resolve. Pt takes tums which she feels slows down palpitations. Aleve does help relieve pressure. Denies excess coffee intake, LOC. \par Pt has hx of anaphylaxis, due to refill epipen\par Pt also c/o R ear feeling intermittently clogged along w/ rhinitis. pt uses flonase prn.

## 2020-05-11 ENCOUNTER — RX RENEWAL (OUTPATIENT)
Age: 25
End: 2020-05-11

## 2020-06-16 DIAGNOSIS — Z11.59 ENCOUNTER FOR SCREENING FOR OTHER VIRAL DISEASES: ICD-10-CM

## 2020-06-23 LAB
SARS-COV-2 IGG SERPL IA-ACNC: 108 INDEX
SARS-COV-2 IGG SERPL QL IA: POSITIVE

## 2020-09-28 ENCOUNTER — RX RENEWAL (OUTPATIENT)
Age: 25
End: 2020-09-28

## 2020-09-29 ENCOUNTER — APPOINTMENT (OUTPATIENT)
Dept: FAMILY MEDICINE | Facility: CLINIC | Age: 25
End: 2020-09-29
Payer: MEDICAID

## 2020-09-29 VITALS
BODY MASS INDEX: 39.68 KG/M2 | RESPIRATION RATE: 16 BRPM | HEIGHT: 72 IN | SYSTOLIC BLOOD PRESSURE: 120 MMHG | HEART RATE: 92 BPM | TEMPERATURE: 97.3 F | WEIGHT: 293 LBS | DIASTOLIC BLOOD PRESSURE: 80 MMHG | OXYGEN SATURATION: 98 %

## 2020-09-29 DIAGNOSIS — Z23 ENCOUNTER FOR IMMUNIZATION: ICD-10-CM

## 2020-09-29 DIAGNOSIS — I87.2 VENOUS INSUFFICIENCY (CHRONIC) (PERIPHERAL): ICD-10-CM

## 2020-09-29 PROCEDURE — 86580 TB INTRADERMAL TEST: CPT

## 2020-09-29 PROCEDURE — G0008: CPT

## 2020-09-29 PROCEDURE — 99213 OFFICE O/P EST LOW 20 MIN: CPT | Mod: 25

## 2020-09-29 PROCEDURE — 90686 IIV4 VACC NO PRSV 0.5 ML IM: CPT

## 2020-10-01 ENCOUNTER — APPOINTMENT (OUTPATIENT)
Dept: FAMILY MEDICINE | Facility: CLINIC | Age: 25
End: 2020-10-01

## 2020-10-01 PROBLEM — Z23 FLU VACCINE NEED: Status: ACTIVE | Noted: 2018-11-05

## 2020-10-01 RX ORDER — LANCETS 30 GAUGE
EACH MISCELLANEOUS
Qty: 50 | Refills: 0 | Status: COMPLETED | COMMUNITY
Start: 2020-04-02

## 2020-10-01 RX ORDER — CLINDAMYCIN PHOSPHATE 10 MG/ML
1 SOLUTION TOPICAL
Qty: 60 | Refills: 0 | Status: COMPLETED | COMMUNITY
Start: 2020-04-29

## 2020-10-01 RX ORDER — TRIAMCINOLONE ACETONIDE 0.25 MG/G
0.03 OINTMENT TOPICAL
Qty: 80 | Refills: 0 | Status: COMPLETED | COMMUNITY
Start: 2020-04-29

## 2020-10-01 RX ORDER — BENZOYL PEROXIDE 5 G/100G
5 GEL TOPICAL
Qty: 60 | Refills: 0 | Status: COMPLETED | COMMUNITY
Start: 2020-04-29

## 2020-10-01 NOTE — ASSESSMENT
[FreeTextEntry1] : b/l LE edema - advised pt it is likely secondary to venous insufficiency and chronic morbid obesity. Advised lifestyle modifications for wt loss. refer to vascular for eval\par Work forms completed for pt\par Risks and benefits of flu vaccine discussed w/ pt. Consent given by pt, flu vaccine administered. Pt tolerated well\par PPD placed in right forearm. RTO in 48-72 hrs for read

## 2020-10-01 NOTE — HISTORY OF PRESENT ILLNESS
[de-identified] : Pt in office for PPD read. PPD read as negative, 0mm induration. Forms completed for pt

## 2020-10-01 NOTE — HISTORY OF PRESENT ILLNESS
[de-identified] : Pt in office for physical exam and work forms. Pt also c/o chronic edema of b/l LE. Denies CP, palpitations, dyspnea, n/v\par

## 2020-10-08 ENCOUNTER — TRANSCRIPTION ENCOUNTER (OUTPATIENT)
Age: 25
End: 2020-10-08

## 2020-10-08 ENCOUNTER — APPOINTMENT (OUTPATIENT)
Dept: FAMILY MEDICINE | Facility: CLINIC | Age: 25
End: 2020-10-08
Payer: MEDICAID

## 2020-10-08 DIAGNOSIS — Z87.09 PERSONAL HISTORY OF OTHER DISEASES OF THE RESPIRATORY SYSTEM: ICD-10-CM

## 2020-10-08 PROCEDURE — 99213 OFFICE O/P EST LOW 20 MIN: CPT | Mod: 95

## 2020-10-08 NOTE — HISTORY OF PRESENT ILLNESS
[Home] : at home, [unfilled] , at the time of the visit. [Medical Office: ([facility])___] : at the medical office located in  [Verbal consent obtained from patient] : the patient, [unfilled] [FreeTextEntry8] : Pt c/o feeling ill for 5 days. Pt has been having rhinitis, fatigue, congestion. Pt using flonase, dayquil and staying hydrated. Symptoms improving in past few days. Denies fever, chills, cough, anosmia, myalgias, wheezing, dyspnea. Pt has been out from work since 10/5/20, pt needs note to return to work.\par \par Pt did COVID PCR testing 10/7/20 at [facility], results pending

## 2020-10-08 NOTE — ASSESSMENT
[FreeTextEntry1] : URI - cont  flonase bid, dayquil prn. Increase po fluid intake to maintain adequate hydration. Rest. COVID PCR testing pending, if negative pt may return to work full duty.\par \par I have spent 15 minutes of time during this encounter including face to face time with patient and review of chart. >50% of time was spent counseling and/or coordinating care for above problems.

## 2020-10-08 NOTE — REVIEW OF SYSTEMS
[Fatigue] : fatigue [Nasal Discharge] : nasal discharge [Postnasal Drip] : postnasal drip [Negative] : Psychiatric [Fever] : no fever [Chills] : no chills

## 2020-11-19 ENCOUNTER — RX RENEWAL (OUTPATIENT)
Age: 25
End: 2020-11-19

## 2020-12-16 PROBLEM — Z01.419 ENCOUNTER FOR ANNUAL ROUTINE GYNECOLOGICAL EXAMINATION: Status: RESOLVED | Noted: 2018-05-01 | Resolved: 2020-12-16

## 2020-12-21 ENCOUNTER — TRANSCRIPTION ENCOUNTER (OUTPATIENT)
Age: 25
End: 2020-12-21

## 2020-12-23 PROBLEM — N39.0 ACUTE UTI: Status: RESOLVED | Noted: 2020-02-24 | Resolved: 2020-12-23

## 2020-12-23 PROBLEM — Z87.09 HISTORY OF UPPER RESPIRATORY INFECTION: Status: RESOLVED | Noted: 2019-11-04 | Resolved: 2020-12-23

## 2020-12-28 ENCOUNTER — RX RENEWAL (OUTPATIENT)
Age: 25
End: 2020-12-28

## 2021-02-12 ENCOUNTER — RX RENEWAL (OUTPATIENT)
Age: 26
End: 2021-02-12

## 2021-02-12 RX ORDER — LORATADINE 10 MG/1
10 TABLET ORAL
Qty: 30 | Refills: 1 | Status: ACTIVE | COMMUNITY
Start: 2021-02-12 | End: 1900-01-01

## 2021-03-11 ENCOUNTER — RX RENEWAL (OUTPATIENT)
Age: 26
End: 2021-03-11

## 2021-04-02 ENCOUNTER — EMERGENCY (EMERGENCY)
Facility: HOSPITAL | Age: 26
LOS: 1 days | End: 2021-04-02
Payer: COMMERCIAL

## 2021-04-02 VITALS
WEIGHT: 293 LBS | RESPIRATION RATE: 20 BRPM | HEART RATE: 110 BPM | SYSTOLIC BLOOD PRESSURE: 138 MMHG | DIASTOLIC BLOOD PRESSURE: 62 MMHG | TEMPERATURE: 99 F | OXYGEN SATURATION: 98 % | HEIGHT: 72 IN

## 2021-04-02 VITALS — HEART RATE: 100 BPM

## 2021-04-02 PROCEDURE — U0003: CPT

## 2021-04-02 PROCEDURE — 99284 EMERGENCY DEPT VISIT MOD MDM: CPT

## 2021-04-02 PROCEDURE — U0005: CPT

## 2021-04-02 PROCEDURE — 99283 EMERGENCY DEPT VISIT LOW MDM: CPT

## 2021-04-02 PROCEDURE — 87631 RESP VIRUS 3-5 TARGETS: CPT

## 2021-04-02 NOTE — ED PROVIDER NOTE - NS ED ROS FT
+fever/chills, No photophobia/eye pain/changes in vision, No ear pain/sore throat/dysphagia, No chest pain/palpitations, no SOB/cough/wheeze/stridor, No abdominal pain, No N/V/D, no dysuria/frequency/discharge, No neck/back pain, no rash, no changes in neurological status/function.

## 2021-04-02 NOTE — ED PROVIDER NOTE - PATIENT PORTAL LINK FT
You can access the FollowMyHealth Patient Portal offered by [facility] by registering at the following website: [email or web address]. By joining [employer]’s FollowMyHealth portal, you will also be able to view your health information using other applications (apps) compatible with our system.

## 2021-04-02 NOTE — ED PROVIDER NOTE - ATTENDING CONTRIBUTION TO CARE
here for covid testing, patient also asking to speak with MD after swab. patient endorsing symptoms consistent with viral syndrome. vitals stable. discussed taking acetaminophen/ibuprofen for symptoms and fluid hydration. return precautions discussed

## 2021-04-02 NOTE — ED PROVIDER NOTE - OBJECTIVE STATEMENT
This is a 25 year old female here for fever and chills x 2 days, requesting a covid test, denies any other symptoms.

## 2021-04-06 ENCOUNTER — APPOINTMENT (OUTPATIENT)
Dept: FAMILY MEDICINE | Facility: CLINIC | Age: 26
End: 2021-04-06
Payer: MEDICAID

## 2021-04-06 DIAGNOSIS — R10.9 UNSPECIFIED ABDOMINAL PAIN: ICD-10-CM

## 2021-04-06 DIAGNOSIS — N39.0 URINARY TRACT INFECTION, SITE NOT SPECIFIED: ICD-10-CM

## 2021-04-06 DIAGNOSIS — R50.9 FEVER, UNSPECIFIED: ICD-10-CM

## 2021-04-06 PROCEDURE — 99214 OFFICE O/P EST MOD 30 MIN: CPT | Mod: 95

## 2021-04-06 RX ORDER — BUTALBITAL, ACETAMINOPHEN AND CAFFEINE 325; 50; 40 MG/1; MG/1; MG/1
50-325-40 TABLET ORAL
Qty: 30 | Refills: 0 | Status: ACTIVE | COMMUNITY
Start: 2018-11-12 | End: 1900-01-01

## 2021-04-06 NOTE — HISTORY OF PRESENT ILLNESS
[Home] : at home, [unfilled] , at the time of the visit. [Medical Office: ([facility])___] : at the medical office located in  [Verbal consent obtained from patient] : the patient, [unfilled] [FreeTextEntry8] : Pt c/o 1 week of myalgias, fatigue, fever up to 102F. Pt went to [facility] 4/2/21. COVID PCR came back negative. Since then fever has resolved but pt having R flank pain and migraine headaches. Denies dysuria, malodorous urine, n/v, abnormal stools, inc pain w/ food intake.

## 2021-04-06 NOTE — ASSESSMENT
[FreeTextEntry1] : febrile illness, R abd pain - fever resolving, possible viral illness. monitor for improvement in R abdominal pain. if pain worsens order CT abd/pelvis\par migraines - sumatriptan prn onset headaches. fioricet prn severe migraine.\par DM- stable. pt to f/u future visit for repeat labs.\par \par I have spent 20 minutes of time during this encounter including face to face time with patient and review of chart. >50% of time was spent counseling and/or coordinating care for above problems.

## 2021-04-07 PROBLEM — N39.0 UTI (URINARY TRACT INFECTION): Status: ACTIVE | Noted: 2021-04-07

## 2021-04-20 ENCOUNTER — APPOINTMENT (OUTPATIENT)
Dept: FAMILY MEDICINE | Facility: CLINIC | Age: 26
End: 2021-04-20

## 2021-04-29 ENCOUNTER — RX RENEWAL (OUTPATIENT)
Age: 26
End: 2021-04-29

## 2021-05-20 NOTE — ED PROVIDER NOTE - SECONDARY DIAGNOSIS.
Number Of Freeze-Thaw Cycles: 2 freeze-thaw cycles
Post-Care Instructions: I reviewed with the patient in detail post-care instructions. Patient is to wear sunprotection, and avoid picking at any of the treated lesions. Pt may apply Vaseline to crusted or scabbing areas.
Render Post-Care Instructions In Note?: yes
Duration Of Freeze Thaw-Cycle (Seconds): 9
Consent: The patient's consent was obtained including but not limited to risks of crusting, scabbing, blistering, scarring, darker or lighter pigmentary change, recurrence, incomplete removal and infection.
Detail Level: Detailed
Render Note In Bullet Format When Appropriate: No
Urinary tract infection

## 2021-05-26 ENCOUNTER — RX RENEWAL (OUTPATIENT)
Age: 26
End: 2021-05-26

## 2021-06-01 ENCOUNTER — OUTPATIENT (OUTPATIENT)
Dept: OUTPATIENT SERVICES | Facility: HOSPITAL | Age: 26
LOS: 1 days | End: 2021-06-01
Payer: MEDICAID

## 2021-06-08 NOTE — ED PROVIDER NOTE - PRO INTERPRETER NEED 2
Patient assisted to bathroom, brilinta education materials explained and given to patient brilinta coupon provided to patient    [phone]  Two failed attempts of blood work, patient tolerated these but requested nurse author to take a break before doing the third attempt, patient's veins are flat and the patient reiterated that he does not want needle sticks in the hands.  Will attempt again after the patient rested English

## 2021-06-29 ENCOUNTER — EMERGENCY (EMERGENCY)
Facility: HOSPITAL | Age: 26
LOS: 1 days | Discharge: HOME | End: 2021-06-29
Attending: EMERGENCY MEDICINE
Payer: COMMERCIAL

## 2021-06-29 VITALS
TEMPERATURE: 98 F | HEART RATE: 89 BPM | DIASTOLIC BLOOD PRESSURE: 87 MMHG | SYSTOLIC BLOOD PRESSURE: 114 MMHG | HEIGHT: 72 IN | RESPIRATION RATE: 18 BRPM | OXYGEN SATURATION: 99 % | WEIGHT: 293 LBS

## 2021-06-29 PROCEDURE — 99283 EMERGENCY DEPT VISIT LOW MDM: CPT

## 2021-06-29 RX ORDER — IBUPROFEN 200 MG
800 TABLET ORAL ONCE
Refills: 0 | Status: COMPLETED | OUTPATIENT
Start: 2021-06-29 | End: 2021-06-29

## 2021-06-29 RX ADMIN — Medication 1 TABLET(S): at 11:26

## 2021-06-29 RX ADMIN — Medication 800 MILLIGRAM(S): at 11:26

## 2021-06-29 NOTE — ED PROVIDER NOTE - ENMT, MLM
Airway patent, Nasal mucosa clear. Mouth with normal mucosa. Throat has no vesicles, no oropharyngeal exudates and uvula is midline. Mild lower molar TTP

## 2021-06-29 NOTE — ED PROVIDER NOTE - CLINICAL SUMMARY MEDICAL DECISION MAKING FREE TEXT BOX
The patient presents with dental pain and will dc home with PO abx and will follow up with dentist office

## 2021-06-29 NOTE — ED ADULT TRIAGE NOTE - CHIEF COMPLAINT QUOTE
patient c/o pain on left side of mouth, stated that she has an abscess there and can't wait for a dentist.

## 2021-06-29 NOTE — ED PROVIDER NOTE - PATIENT PORTAL LINK FT
You can access the FollowMyHealth Patient Portal offered by [facility] by registering at the following website: [email or web address]. By joining [employer]’s FollowMyHealth portal, you will also be able to view your health information using other applications (apps) compatible with our system.

## 2021-06-29 NOTE — ED PROVIDER NOTE - OBJECTIVE STATEMENT
The patient is a 25 year old female presents with left sided lower toothache  No fever, No chill, No trauma

## 2021-07-07 DIAGNOSIS — Z71.89 OTHER SPECIFIED COUNSELING: ICD-10-CM

## 2021-07-12 ENCOUNTER — LABORATORY RESULT (OUTPATIENT)
Age: 26
End: 2021-07-12

## 2021-07-12 ENCOUNTER — APPOINTMENT (OUTPATIENT)
Dept: FAMILY MEDICINE | Facility: CLINIC | Age: 26
End: 2021-07-12
Payer: MEDICAID

## 2021-07-12 VITALS
SYSTOLIC BLOOD PRESSURE: 123 MMHG | DIASTOLIC BLOOD PRESSURE: 80 MMHG | WEIGHT: 293 LBS | OXYGEN SATURATION: 98 % | HEIGHT: 72 IN | HEART RATE: 73 BPM | BODY MASS INDEX: 39.68 KG/M2 | TEMPERATURE: 97.1 F

## 2021-07-12 DIAGNOSIS — Z13.29 ENCOUNTER FOR SCREENING FOR OTHER SUSPECTED ENDOCRINE DISORDER: ICD-10-CM

## 2021-07-12 DIAGNOSIS — I89.0 LYMPHEDEMA, NOT ELSEWHERE CLASSIFIED: ICD-10-CM

## 2021-07-12 DIAGNOSIS — Z11.3 ENCOUNTER FOR SCREENING FOR INFECTIONS WITH A PREDOMINANTLY SEXUAL MODE OF TRANSMISSION: ICD-10-CM

## 2021-07-12 DIAGNOSIS — Z13.0 ENCOUNTER FOR SCREENING FOR OTHER SUSPECTED ENDOCRINE DISORDER: ICD-10-CM

## 2021-07-12 DIAGNOSIS — Z13.228 ENCOUNTER FOR SCREENING FOR OTHER SUSPECTED ENDOCRINE DISORDER: ICD-10-CM

## 2021-07-12 DIAGNOSIS — R09.82 POSTNASAL DRIP: ICD-10-CM

## 2021-07-12 DIAGNOSIS — Z23 ENCOUNTER FOR IMMUNIZATION: ICD-10-CM

## 2021-07-12 PROCEDURE — 99395 PREV VISIT EST AGE 18-39: CPT | Mod: 25

## 2021-07-12 PROCEDURE — 96127 BRIEF EMOTIONAL/BEHAV ASSMT: CPT

## 2021-07-12 RX ORDER — MOMETASONE 50 UG/1
50 SPRAY, METERED NASAL
Qty: 1 | Refills: 2 | Status: ACTIVE | COMMUNITY
Start: 2021-07-12 | End: 1900-01-01

## 2021-07-12 RX ORDER — METOPROLOL SUCCINATE 25 MG/1
25 TABLET, EXTENDED RELEASE ORAL DAILY
Qty: 90 | Refills: 0 | Status: COMPLETED | COMMUNITY
Start: 2020-04-02 | End: 2021-07-12

## 2021-07-12 RX ORDER — FLUTICASONE PROPIONATE 50 UG/1
50 SPRAY, METERED NASAL
Qty: 16 | Refills: 0 | Status: COMPLETED | COMMUNITY
Start: 2019-11-04 | End: 2021-07-12

## 2021-07-12 RX ORDER — FLUTICASONE PROPIONATE 50 UG/1
50 SPRAY, METERED NASAL
Qty: 9.9 | Refills: 0 | Status: COMPLETED | COMMUNITY
Start: 2020-01-29 | End: 2021-07-12

## 2021-07-12 RX ORDER — CIPROFLOXACIN HYDROCHLORIDE 500 MG/1
500 TABLET, FILM COATED ORAL
Qty: 10 | Refills: 0 | Status: COMPLETED | COMMUNITY
Start: 2021-04-07 | End: 2021-07-12

## 2021-07-12 NOTE — HEALTH RISK ASSESSMENT
[Patient reported PAP Smear was normal] : Patient reported PAP Smear was normal [PapSmearDate] : 07/20

## 2021-07-12 NOTE — ASSESSMENT
[FreeTextEntry1] : allergies, ETD - refill loratidine\par PND - dc fluticasone, start nasonex q day\par gardasil administered, pt consent given before administration and after discussion of risks/benefits, pt tolerated well. pt to receive 3rd dose in 4 mos\par Healthy diet and regular exercise regimen discussed w/ pt.\par Screening labs ordered, std screening\par Advised routine pap smear\par Any questions call office

## 2021-07-12 NOTE — HISTORY OF PRESENT ILLNESS
[de-identified] : Pt in office for CPE.\par Pt c/o nasal congestion, mild cough for several weeks. This is worse at night.\par Pt does report she snores often and has daytime somnolence. Pt has never had a sleep study and is morbidly obese.\par Pt sees Dr. Eduard Maradiaga q 3 months for DM, pt on metformin 2000mg a day and trulicity q week. Pt seeing nutritionist.\par Migraines have been better controlled as pt has been less stressed. Pt now using imitrex and fioricet sparingly. Last migraine several months ago. 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 Sexually active in past year 2 partners uses protection every time

## 2021-07-16 LAB
ALBUMIN SERPL ELPH-MCNC: 4.6 G/DL
ALP BLD-CCNC: 93 U/L
ALT SERPL-CCNC: 22 U/L
ANION GAP SERPL CALC-SCNC: 10 MMOL/L
APPEARANCE: CLEAR
AST SERPL-CCNC: 16 U/L
BACTERIA: ABNORMAL
BASOPHILS # BLD AUTO: 0.03 K/UL
BASOPHILS NFR BLD AUTO: 0.5 %
BILIRUB SERPL-MCNC: 0.3 MG/DL
BILIRUBIN URINE: NEGATIVE
BLOOD URINE: NEGATIVE
BUN SERPL-MCNC: 9 MG/DL
C TRACH RRNA SPEC QL NAA+PROBE: NOT DETECTED
CALCIUM SERPL-MCNC: 9.8 MG/DL
CHLORIDE SERPL-SCNC: 102 MMOL/L
CHOLEST SERPL-MCNC: 165 MG/DL
CO2 SERPL-SCNC: 24 MMOL/L
COLOR: YELLOW
CREAT SERPL-MCNC: 0.91 MG/DL
EOSINOPHIL # BLD AUTO: 0.03 K/UL
EOSINOPHIL NFR BLD AUTO: 0.5 %
ESTIMATED AVERAGE GLUCOSE: 91 MG/DL
GLUCOSE QUALITATIVE U: NEGATIVE
GLUCOSE SERPL-MCNC: 70 MG/DL
HBA1C MFR BLD HPLC: 4.8 %
HCT VFR BLD CALC: 39.1 %
HDLC SERPL-MCNC: 57 MG/DL
HGB BLD-MCNC: 12.4 G/DL
HIV1+2 AB SPEC QL IA.RAPID: NONREACTIVE
HYALINE CASTS: 2 /LPF
IMM GRANULOCYTES NFR BLD AUTO: 0.5 %
KETONES URINE: NEGATIVE
LDLC SERPL CALC-MCNC: 91 MG/DL
LEUKOCYTE ESTERASE URINE: ABNORMAL
LYMPHOCYTES # BLD AUTO: 2.56 K/UL
LYMPHOCYTES NFR BLD AUTO: 41.6 %
MAN DIFF?: NORMAL
MCHC RBC-ENTMCNC: 30.8 PG
MCHC RBC-ENTMCNC: 31.7 GM/DL
MCV RBC AUTO: 97.3 FL
MICROSCOPIC-UA: NORMAL
MONOCYTES # BLD AUTO: 0.38 K/UL
MONOCYTES NFR BLD AUTO: 6.2 %
N GONORRHOEA RRNA SPEC QL NAA+PROBE: NOT DETECTED
NEUTROPHILS # BLD AUTO: 3.13 K/UL
NEUTROPHILS NFR BLD AUTO: 50.7 %
NITRITE URINE: NEGATIVE
NONHDLC SERPL-MCNC: 108 MG/DL
PH URINE: 6.5
PLATELET # BLD AUTO: 310 K/UL
POTASSIUM SERPL-SCNC: 4.3 MMOL/L
PROT SERPL-MCNC: 7.9 G/DL
PROTEIN URINE: NORMAL
RBC # BLD: 4.02 M/UL
RBC # FLD: 13.7 %
RED BLOOD CELLS URINE: 2 /HPF
SODIUM SERPL-SCNC: 136 MMOL/L
SOURCE AMPLIFICATION: NORMAL
SPECIFIC GRAVITY URINE: 1.02
SQUAMOUS EPITHELIAL CELLS: 10 /HPF
T PALLIDUM AB SER QL IA: NEGATIVE
T4 FREE SERPL-MCNC: 1 NG/DL
TRIGL SERPL-MCNC: 90 MG/DL
TSH SERPL-ACNC: 2.74 UIU/ML
UROBILINOGEN URINE: NORMAL
WBC # FLD AUTO: 6.16 K/UL
WHITE BLOOD CELLS URINE: 12 /HPF

## 2021-08-31 ENCOUNTER — APPOINTMENT (OUTPATIENT)
Dept: FAMILY MEDICINE | Facility: CLINIC | Age: 26
End: 2021-08-31
Payer: MEDICAID

## 2021-08-31 VITALS
DIASTOLIC BLOOD PRESSURE: 82 MMHG | HEIGHT: 72 IN | OXYGEN SATURATION: 98 % | HEART RATE: 82 BPM | BODY MASS INDEX: 39.68 KG/M2 | TEMPERATURE: 97 F | SYSTOLIC BLOOD PRESSURE: 120 MMHG | WEIGHT: 293 LBS

## 2021-08-31 PROCEDURE — 99213 OFFICE O/P EST LOW 20 MIN: CPT | Mod: 25

## 2021-08-31 PROCEDURE — 86580 TB INTRADERMAL TEST: CPT

## 2021-08-31 NOTE — PHYSICAL EXAM
[Normal Oropharynx] : the oropharynx was normal [Soft] : abdomen soft [Non Tender] : non-tender [No Focal Deficits] : no focal deficits [Normal] : affect was normal and insight and judgment were intact

## 2021-08-31 NOTE — HISTORY OF PRESENT ILLNESS
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 Denies CP, palpitations, dyspnea, n/v

## 2021-09-03 ENCOUNTER — APPOINTMENT (OUTPATIENT)
Dept: FAMILY MEDICINE | Facility: CLINIC | Age: 26
End: 2021-09-03

## 2021-12-01 PROCEDURE — G9005: CPT

## 2022-04-05 ENCOUNTER — APPOINTMENT (OUTPATIENT)
Dept: FAMILY MEDICINE | Facility: CLINIC | Age: 27
End: 2022-04-05
Payer: MEDICAID

## 2022-04-05 VITALS
SYSTOLIC BLOOD PRESSURE: 132 MMHG | OXYGEN SATURATION: 98 % | TEMPERATURE: 97.2 F | BODY MASS INDEX: 39.68 KG/M2 | WEIGHT: 293 LBS | HEIGHT: 72 IN | DIASTOLIC BLOOD PRESSURE: 84 MMHG | HEART RATE: 98 BPM

## 2022-04-05 DIAGNOSIS — J01.00 ACUTE MAXILLARY SINUSITIS, UNSPECIFIED: ICD-10-CM

## 2022-04-05 PROCEDURE — 99213 OFFICE O/P EST LOW 20 MIN: CPT

## 2022-04-05 NOTE — HISTORY OF PRESENT ILLNESS
[FreeTextEntry8] : Pt c/o throat irritation, sinus pain/pressure, cough, congestion x 10 days. Cough is productive of clear mucous initially then changed to yellow thick foul mucous in past few days. pt using robitussin nyquil dayquil which haven't helped. Denies CP, palpitations, dyspnea, n/v\par \par Pt has DM, still on trulicity sees endo Dr. Maradiaga. A1C improved on meds.

## 2022-04-05 NOTE — ASSESSMENT
[FreeTextEntry1] : acute maxillary sinusitis - start fluticasone bid. Increase po fluid intake to maintain adequate hydration. Rest. Start course of antibx if no improvement in symptoms\par DM- f/u endo, well contorlled. cont trulicity

## 2022-04-11 PROBLEM — Z11.59 SCREENING FOR VIRAL DISEASE: Status: ACTIVE | Noted: 2020-06-16

## 2022-04-21 ENCOUNTER — APPOINTMENT (OUTPATIENT)
Dept: FAMILY MEDICINE | Facility: CLINIC | Age: 27
End: 2022-04-21
Payer: MEDICAID

## 2022-04-21 VITALS
TEMPERATURE: 97.8 F | SYSTOLIC BLOOD PRESSURE: 124 MMHG | WEIGHT: 293 LBS | BODY MASS INDEX: 39.68 KG/M2 | HEIGHT: 72 IN | HEART RATE: 94 BPM | DIASTOLIC BLOOD PRESSURE: 78 MMHG | OXYGEN SATURATION: 99 %

## 2022-04-21 DIAGNOSIS — S40.862A INSECT BITE (NONVENOMOUS) OF LEFT UPPER ARM, INITIAL ENCOUNTER: ICD-10-CM
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
## 2022-04-21 PROCEDURE — 99213 OFFICE O/P EST LOW 20 MIN: CPT

## 2022-04-21 NOTE — HISTORY OF PRESENT ILLNESS
[FreeTextEntry8] : Pruritic erythematous induration left forearm secondary to ostensible insect bite.  It is itchy.  Lesion is improving.  No lymphadenopathy no lymphangitis no significant surrounding erythema.\par \par Resolving insect bite Benadryl cream reassure

## 2022-05-14 ENCOUNTER — APPOINTMENT (OUTPATIENT)
Dept: FAMILY MEDICINE | Facility: CLINIC | Age: 27
End: 2022-05-14
Payer: MEDICAID

## 2022-05-14 DIAGNOSIS — Z20.822 CONTACT WITH AND (SUSPECTED) EXPOSURE TO COVID-19: ICD-10-CM

## 2022-05-14 DIAGNOSIS — J01.90 ACUTE SINUSITIS, UNSPECIFIED: ICD-10-CM

## 2022-05-14 PROCEDURE — 99213 OFFICE O/P EST LOW 20 MIN: CPT | Mod: 95

## 2022-05-14 RX ORDER — SULFAMETHOXAZOLE AND TRIMETHOPRIM 800; 160 MG/1; MG/1
800-160 TABLET ORAL TWICE DAILY
Qty: 10 | Refills: 0 | Status: DISCONTINUED | COMMUNITY
Start: 2020-02-27 | End: 2022-05-14

## 2022-05-14 RX ORDER — AMOXICILLIN AND CLAVULANATE POTASSIUM 875; 125 MG/1; MG/1
875-125 TABLET, COATED ORAL
Qty: 14 | Refills: 0 | Status: DISCONTINUED | COMMUNITY
Start: 2022-04-05 | End: 2022-05-14

## 2022-05-14 RX ORDER — NITROFURANTOIN MACROCRYSTALS 100 MG/1
100 CAPSULE ORAL
Qty: 14 | Refills: 0 | Status: DISCONTINUED | COMMUNITY
Start: 2020-02-24 | End: 2022-05-14

## 2022-05-14 NOTE — REVIEW OF SYSTEMS
[Cough] : cough [Negative] : Constitutional [Chest Pain] : no chest pain [Shortness Of Breath] : no shortness of breath [FreeTextEntry4] : as per HPI

## 2022-05-14 NOTE — PHYSICAL EXAM
[No Acute Distress] : no acute distress [Well Nourished] : well nourished [Well Developed] : well developed [Normal Appearance] : was normal in appearance [No Respiratory Distress] : no respiratory distress  [No Accessory Muscle Use] : no accessory muscle use [Alert and Oriented x3] : oriented to person, place, and time

## 2022-05-14 NOTE — ASSESSMENT
[FreeTextEntry1] : possibly with COVID-19 (despite negative at home test)\par recommend patient get COVID-19 PCR test\par may have sinusitis \par c/w supportive treatment- Fluticasone nasal spray, over the counter decongestant \par maintain fluid hydration\par call back if symptoms worsen or don't improve

## 2022-05-14 NOTE — HISTORY OF PRESENT ILLNESS
[Home] : at home, [unfilled] , at the time of the visit. [Medical Office: ([facility])___] : at the medical office located in  [Verbal consent obtained from patient] : the patient, [unfilled] [FreeTextEntry8] : \par 26 year old female presents c/o 5-6 day h/o sinus congestion and cough\par no fever\par no chills\par reports loss of smell and taste for the past 3 days\par no cough\par no chest tightness \par has been using Fluticasone nasal spray and over the counter decongestant with mild relief \par reports previous h/o COVID-19 (has had the virus twice), she is also vaccinated for COVID-19 \par took an at home test- reports negative results\par

## 2022-05-17 ENCOUNTER — RX RENEWAL (OUTPATIENT)
Age: 27
End: 2022-05-17

## 2022-07-05 ENCOUNTER — RX RENEWAL (OUTPATIENT)
Age: 27
End: 2022-07-05

## 2022-08-15 ENCOUNTER — RX RENEWAL (OUTPATIENT)
Age: 27
End: 2022-08-15

## 2022-09-13 ENCOUNTER — APPOINTMENT (OUTPATIENT)
Dept: FAMILY MEDICINE | Facility: CLINIC | Age: 27
End: 2022-09-13

## 2022-09-13 VITALS
WEIGHT: 293 LBS | TEMPERATURE: 97.2 F | HEART RATE: 98 BPM | HEIGHT: 72 IN | BODY MASS INDEX: 39.68 KG/M2 | DIASTOLIC BLOOD PRESSURE: 78 MMHG | OXYGEN SATURATION: 98 % | SYSTOLIC BLOOD PRESSURE: 120 MMHG

## 2022-09-13 DIAGNOSIS — Z02.89 ENCOUNTER FOR OTHER ADMINISTRATIVE EXAMINATIONS: ICD-10-CM

## 2022-09-13 DIAGNOSIS — Z11.1 ENCOUNTER FOR SCREENING FOR RESPIRATORY TUBERCULOSIS: ICD-10-CM

## 2022-09-13 PROCEDURE — 99212 OFFICE O/P EST SF 10 MIN: CPT

## 2022-09-13 NOTE — HISTORY OF PRESENT ILLNESS
[FreeTextEntry8] : PT needs quantiferon test for school\par doing well\par no concerns\par no prior + PPD

## 2022-09-16 ENCOUNTER — TRANSCRIPTION ENCOUNTER (OUTPATIENT)
Age: 27
End: 2022-09-16

## 2022-09-16 LAB
M TB IFN-G BLD-IMP: NEGATIVE
QUANTIFERON TB PLUS MITOGEN MINUS NIL: >10 IU/ML
QUANTIFERON TB PLUS NIL: 0.03 IU/ML
QUANTIFERON TB PLUS TB1 MINUS NIL: 0.01 IU/ML
QUANTIFERON TB PLUS TB2 MINUS NIL: 0.01 IU/ML

## 2022-09-21 ENCOUNTER — APPOINTMENT (OUTPATIENT)
Dept: FAMILY MEDICINE | Facility: CLINIC | Age: 27
End: 2022-09-21

## 2022-11-14 ENCOUNTER — RX RENEWAL (OUTPATIENT)
Age: 27
End: 2022-11-14

## 2022-11-28 ENCOUNTER — NON-APPOINTMENT (OUTPATIENT)
Age: 27
End: 2022-11-28

## 2022-11-28 ENCOUNTER — APPOINTMENT (OUTPATIENT)
Dept: FAMILY MEDICINE | Facility: CLINIC | Age: 27
End: 2022-11-28

## 2022-11-28 VITALS
HEART RATE: 82 BPM | HEIGHT: 72 IN | OXYGEN SATURATION: 98 % | SYSTOLIC BLOOD PRESSURE: 120 MMHG | DIASTOLIC BLOOD PRESSURE: 74 MMHG | TEMPERATURE: 97 F | BODY MASS INDEX: 39.68 KG/M2 | WEIGHT: 293 LBS

## 2022-11-28 DIAGNOSIS — G43.909 MIGRAINE, UNSPECIFIED, NOT INTRACTABLE, W/OUT STATUS MIGRAINOSUS: ICD-10-CM

## 2022-11-28 DIAGNOSIS — M94.0 CHONDROCOSTAL JUNCTION SYNDROME [TIETZE]: ICD-10-CM

## 2022-11-28 PROCEDURE — 99214 OFFICE O/P EST MOD 30 MIN: CPT | Mod: 25

## 2022-11-28 PROCEDURE — 93000 ELECTROCARDIOGRAM COMPLETE: CPT

## 2022-11-28 RX ORDER — SUMATRIPTAN 25 MG/1
25 TABLET, FILM COATED ORAL
Qty: 1 | Refills: 0 | Status: ACTIVE | COMMUNITY
Start: 2018-11-05 | End: 1900-01-01

## 2022-11-28 NOTE — HISTORY OF PRESENT ILLNESS
[FreeTextEntry8] : Pt c/o chest pain x 1 week 6-7/10 severity. Chest pain has been accompanied by headaches and palpitations, dyspnea on exertion. These episodes would occur at random, not related to activity. Pain lasted the whole day on 11/23 but other days it has been intermittent lasting 1-2 hrs. Estela seltzer helped slightly, pt had hx of GERD. Tylenol helped HA but not CP. Pt does report inc anxiety/stress at work lately. Pt has hx of migraines, imitrex has helped in the past.

## 2022-11-28 NOTE — PHYSICAL EXAM
[Soft] : abdomen soft [Non Tender] : non-tender [Non-distended] : non-distended [Normal] : affect was normal and insight and judgment were intact [de-identified] : chest wall tenderness to palpation

## 2022-12-19 ENCOUNTER — RX RENEWAL (OUTPATIENT)
Age: 27
End: 2022-12-19

## 2022-12-31 ENCOUNTER — NON-APPOINTMENT (OUTPATIENT)
Age: 27
End: 2022-12-31

## 2023-01-17 ENCOUNTER — NON-APPOINTMENT (OUTPATIENT)
Age: 28
End: 2023-01-17

## 2023-02-01 ENCOUNTER — RX RENEWAL (OUTPATIENT)
Age: 28
End: 2023-02-01

## 2023-02-28 ENCOUNTER — APPOINTMENT (OUTPATIENT)
Dept: FAMILY MEDICINE | Facility: CLINIC | Age: 28
End: 2023-02-28

## 2023-03-14 ENCOUNTER — EMERGENCY (EMERGENCY)
Facility: HOSPITAL | Age: 28
LOS: 1 days | Discharge: HOME | End: 2023-03-14
Attending: STUDENT IN AN ORGANIZED HEALTH CARE EDUCATION/TRAINING PROGRAM
Payer: COMMERCIAL

## 2023-03-14 VITALS
RESPIRATION RATE: 20 BRPM | HEART RATE: 102 BPM | OXYGEN SATURATION: 100 % | TEMPERATURE: 98 F | SYSTOLIC BLOOD PRESSURE: 140 MMHG | DIASTOLIC BLOOD PRESSURE: 94 MMHG

## 2023-03-14 LAB
ALBUMIN SERPL ELPH-MCNC: 4.1 G/DL — SIGNIFICANT CHANGE UP (ref 3.3–5.2)
ALP SERPL-CCNC: 92 U/L — SIGNIFICANT CHANGE UP (ref 40–120)
ALT FLD-CCNC: 22 U/L — SIGNIFICANT CHANGE UP
ANION GAP SERPL CALC-SCNC: 14 MMOL/L — SIGNIFICANT CHANGE UP (ref 5–17)
AST SERPL-CCNC: 28 U/L — SIGNIFICANT CHANGE UP
BASE EXCESS BLDV CALC-SCNC: -1 MMOL/L — SIGNIFICANT CHANGE UP (ref -2–3)
BASOPHILS # BLD AUTO: 0.03 K/UL — SIGNIFICANT CHANGE UP (ref 0–0.2)
BASOPHILS NFR BLD AUTO: 0.5 % — SIGNIFICANT CHANGE UP (ref 0–2)
BILIRUB SERPL-MCNC: 0.2 MG/DL — LOW (ref 0.4–2)
BLD GP AB SCN SERPL QL: SIGNIFICANT CHANGE UP
BLOOD GAS SOURCE: SIGNIFICANT CHANGE UP
BUN SERPL-MCNC: 9.6 MG/DL — SIGNIFICANT CHANGE UP (ref 8–20)
CA-I SERPL-SCNC: 1.17 MMOL/L — SIGNIFICANT CHANGE UP (ref 1.15–1.33)
CALCIUM SERPL-MCNC: 9.6 MG/DL — SIGNIFICANT CHANGE UP (ref 8.4–10.5)
CHLORIDE BLDV-SCNC: 104 MMOL/L — SIGNIFICANT CHANGE UP (ref 96–108)
CHLORIDE SERPL-SCNC: 102 MMOL/L — SIGNIFICANT CHANGE UP (ref 96–108)
CO2 SERPL-SCNC: 22 MMOL/L — SIGNIFICANT CHANGE UP (ref 22–29)
COHGB MFR BLDV: 7.3 % — HIGH
CREAT SERPL-MCNC: 0.94 MG/DL — SIGNIFICANT CHANGE UP (ref 0.5–1.3)
EGFR: 85 ML/MIN/1.73M2 — SIGNIFICANT CHANGE UP
EOSINOPHIL # BLD AUTO: 0.01 K/UL — SIGNIFICANT CHANGE UP (ref 0–0.5)
EOSINOPHIL NFR BLD AUTO: 0.2 % — SIGNIFICANT CHANGE UP (ref 0–6)
GAS PNL BLDV: 137 MMOL/L — SIGNIFICANT CHANGE UP (ref 136–145)
GAS PNL BLDV: SIGNIFICANT CHANGE UP
GLUCOSE BLDV-MCNC: 107 MG/DL — HIGH (ref 70–99)
GLUCOSE SERPL-MCNC: 104 MG/DL — HIGH (ref 70–99)
HCG SERPL-ACNC: <4 MIU/ML — SIGNIFICANT CHANGE UP
HCO3 BLDV-SCNC: 24 MMOL/L — SIGNIFICANT CHANGE UP (ref 22–29)
HCT VFR BLD CALC: 37.9 % — SIGNIFICANT CHANGE UP (ref 34.5–45)
HCT VFR BLDA CALC: 39 % — SIGNIFICANT CHANGE UP
HGB BLD CALC-MCNC: 12.5 G/DL — SIGNIFICANT CHANGE UP (ref 11.7–16.1)
HGB BLD CALC-MCNC: 12.9 G/DL — SIGNIFICANT CHANGE UP (ref 11.7–16.1)
HGB BLD-MCNC: 12.3 G/DL — SIGNIFICANT CHANGE UP (ref 11.5–15.5)
IMM GRANULOCYTES NFR BLD AUTO: 0.3 % — SIGNIFICANT CHANGE UP (ref 0–0.9)
INR BLD: 1.04 RATIO — SIGNIFICANT CHANGE UP (ref 0.88–1.16)
LACTATE BLDV-MCNC: 2.7 MMOL/L — HIGH (ref 0.5–2)
LYMPHOCYTES # BLD AUTO: 2.85 K/UL — SIGNIFICANT CHANGE UP (ref 1–3.3)
LYMPHOCYTES # BLD AUTO: 48.3 % — HIGH (ref 13–44)
MCHC RBC-ENTMCNC: 30.1 PG — SIGNIFICANT CHANGE UP (ref 27–34)
MCHC RBC-ENTMCNC: 32.5 GM/DL — SIGNIFICANT CHANGE UP (ref 32–36)
MCV RBC AUTO: 92.9 FL — SIGNIFICANT CHANGE UP (ref 80–100)
MONOCYTES # BLD AUTO: 0.42 K/UL — SIGNIFICANT CHANGE UP (ref 0–0.9)
MONOCYTES NFR BLD AUTO: 7.1 % — SIGNIFICANT CHANGE UP (ref 2–14)
NEUTROPHILS # BLD AUTO: 2.57 K/UL — SIGNIFICANT CHANGE UP (ref 1.8–7.4)
NEUTROPHILS NFR BLD AUTO: 43.6 % — SIGNIFICANT CHANGE UP (ref 43–77)
PCO2 BLDV: 42 MMHG — SIGNIFICANT CHANGE UP (ref 39–42)
PH BLDV: 7.37 — SIGNIFICANT CHANGE UP (ref 7.32–7.43)
PLATELET # BLD AUTO: 275 K/UL — SIGNIFICANT CHANGE UP (ref 150–400)
PO2 BLDV: 50 MMHG — HIGH (ref 25–45)
POTASSIUM BLDV-SCNC: 3.5 MMOL/L — SIGNIFICANT CHANGE UP (ref 3.5–5.1)
POTASSIUM SERPL-MCNC: 3.6 MMOL/L — SIGNIFICANT CHANGE UP (ref 3.5–5.3)
POTASSIUM SERPL-SCNC: 3.6 MMOL/L — SIGNIFICANT CHANGE UP (ref 3.5–5.3)
PROT SERPL-MCNC: 7.9 G/DL — SIGNIFICANT CHANGE UP (ref 6.6–8.7)
PROTHROM AB SERPL-ACNC: 12.1 SEC — SIGNIFICANT CHANGE UP (ref 10.5–13.4)
RBC # BLD: 4.08 M/UL — SIGNIFICANT CHANGE UP (ref 3.8–5.2)
RBC # FLD: 12.5 % — SIGNIFICANT CHANGE UP (ref 10.3–14.5)
SAO2 % BLDV: 81.9 % — SIGNIFICANT CHANGE UP
SODIUM SERPL-SCNC: 138 MMOL/L — SIGNIFICANT CHANGE UP (ref 135–145)
WBC # BLD: 5.9 K/UL — SIGNIFICANT CHANGE UP (ref 3.8–10.5)
WBC # FLD AUTO: 5.9 K/UL — SIGNIFICANT CHANGE UP (ref 3.8–10.5)

## 2023-03-14 PROCEDURE — 82375 ASSAY CARBOXYHB QUANT: CPT

## 2023-03-14 PROCEDURE — 85610 PROTHROMBIN TIME: CPT

## 2023-03-14 PROCEDURE — 82803 BLOOD GASES ANY COMBINATION: CPT

## 2023-03-14 PROCEDURE — 99285 EMERGENCY DEPT VISIT HI MDM: CPT

## 2023-03-14 PROCEDURE — 85018 HEMOGLOBIN: CPT

## 2023-03-14 PROCEDURE — 82435 ASSAY OF BLOOD CHLORIDE: CPT

## 2023-03-14 PROCEDURE — 80053 COMPREHEN METABOLIC PANEL: CPT

## 2023-03-14 PROCEDURE — 84132 ASSAY OF SERUM POTASSIUM: CPT

## 2023-03-14 PROCEDURE — 93010 ELECTROCARDIOGRAM REPORT: CPT

## 2023-03-14 PROCEDURE — 83605 ASSAY OF LACTIC ACID: CPT

## 2023-03-14 PROCEDURE — 82947 ASSAY GLUCOSE BLOOD QUANT: CPT

## 2023-03-14 PROCEDURE — 84702 CHORIONIC GONADOTROPIN TEST: CPT

## 2023-03-14 PROCEDURE — 85025 COMPLETE CBC W/AUTO DIFF WBC: CPT

## 2023-03-14 PROCEDURE — 71045 X-RAY EXAM CHEST 1 VIEW: CPT | Mod: 26

## 2023-03-14 PROCEDURE — 82330 ASSAY OF CALCIUM: CPT

## 2023-03-14 PROCEDURE — 86901 BLOOD TYPING SEROLOGIC RH(D): CPT

## 2023-03-14 PROCEDURE — 85014 HEMATOCRIT: CPT

## 2023-03-14 PROCEDURE — 93005 ELECTROCARDIOGRAM TRACING: CPT

## 2023-03-14 PROCEDURE — 86850 RBC ANTIBODY SCREEN: CPT

## 2023-03-14 PROCEDURE — 86900 BLOOD TYPING SEROLOGIC ABO: CPT

## 2023-03-14 PROCEDURE — 84295 ASSAY OF SERUM SODIUM: CPT

## 2023-03-14 PROCEDURE — 36415 COLL VENOUS BLD VENIPUNCTURE: CPT

## 2023-03-14 PROCEDURE — 71045 X-RAY EXAM CHEST 1 VIEW: CPT
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
## 2023-03-14 NOTE — ED PROVIDER NOTE - PROGRESS NOTE DETAILS
Patient resting comfortably. Patient continues the rest comfortably. Patient on 4liter nasal canula. On RA oxygenation is normal. Airway intact. Patient without respiratory symptoms.

## 2023-03-14 NOTE — ED PROVIDER NOTE - PATIENT PORTAL LINK FT
You can access the FollowMyHealth Patient Portal offered by [facility] by registering at the following website: [email or web address]. By joining [facility]’s FollowMyHealth portal, you will also be able to view your health information using other applications (apps) compatible with our system.

## 2023-03-14 NOTE — ED PROVIDER NOTE - NSFOLLOWUPINSTRUCTIONS_ED_ALL_ED_FT
Smoke Inhalation, Acute      Sudden and severe (acute) smoke inhalation happens when a person breathes in (inhales) smoke. Smoke is a harmful mixture of heated particles and gases produced by burning materials. Smoke inhalation can injure the upper and lower airways. Smoke can also contain many toxic chemicals as well as carbon monoxide and cyanide gas. Carbon monoxide is a gas produced by burning wood, coal, or gasoline. Cyanide gas is produced by the burning of plastic, rubber, paper, or other synthetic materials.    Acute smoke inhalation can make it difficult to breathe and can cause dangerously low levels of oxygen. Smoke inhalation is the most common cause of death from fires.      What are the causes?    This condition results from breathing in harmful particles and gases produced by chemicals or burning materials. Damage to the body can be caused by heat, toxic chemicals, or toxic gases.    Heat from smoke can burn the mouth and throat. Small particles such as soot and steam can enter and damage the lower part of the airway and lungs. Toxic chemicals in smoke can also enter the lungs and burn or irritate the airways inside the lungs. This is the most common type of acute smoke inhalation injury. Carbon monoxide gas binds to red blood cells and prevents them from carrying oxygen. Cyanide gas causes damage by preventing cells from using oxygen.      What increases the risk?    You are more likely to develop this condition if you are not able to leave a smoky or burning environment right away. Use of alcohol or sedative medicines can increase this risk.    The following factors may make the effects of smoke inhalation worse:  •Having certain medical conditions, such as a heart, lung, or neurological disease.      •Being a very young child or a very old person.        What are the signs or symptoms?    Symptoms of this condition depend on the cause of the damage. They also depend on which part of your airway is damaged and how severely.     Damage to the mouth, throat, and upper airway from smoke burns can include:  •Facial burns, burned nasal hairs, and burns inside and around the mouth.      •Soot-colored mucus or saliva or soot in the nasal hairs.      •Difficulty speaking or swallowing.      •Hoarse voice.      •Making loud, high-pitched sounds when breathing, most often when you breathe in (stridor).      Symptoms of lung damage from chemical burns may include:  •Cough with a soot-stained mixture of saliva and mucus (sputum).      •Rapid breathing.      •Shortness of breath.      •Making high-pitched whistling sounds when breathing, most often when you breathe out (wheezing).      •Chest pain or tightness.      Symptoms of carbon monoxide or cyanide poisoning can include:  •Confusion.      •Headache.      •Nausea.      •Weakness.      •Change in skin color (usually pale or cherry red).      •Chest pain and shortness of breath.      •Low blood pressure.      •Loss of consciousness.      •Seizure.      •Coma.      •Cardiac arrest.        How is this diagnosed?     This condition may be diagnosed based on:   •Your symptoms and history of exposure to smoke or chemicals.    •Tests, such as:   •Using a scope to check your upper airway (laryngoscopy) or lower airway (bronchoscopy).      •Measuring the oxygen level of your blood with a device attached to your finger (pulse oximetry).      •Imaging studies, such as a chest X-ray or CT scan.      •ECG (electrocardiogram) or cardiac monitoring to check your heart.      •Blood tests to measure levels of oxygen and carbon monoxide or to check for problems caused by gas inhalation.          How is this treated?    Treatment depends on the amount of damage to your upper airway and your lungs. Acute smoke inhalation usually requires emergency treatment at a hospital. Making sure there is an open airway for breathing is the most important early treatment. This may require placing a flexible tube in your windpipe (endotracheal tube). If an endotracheal tube cannot be placed because of airway swelling, you may need to have a tube placed in your airway through a surgical opening in your neck (tracheostomy tube).    Other treatments for smoke inhalation may include:  •Getting oxygen through a face mask or tube.      •IV fluids.      •Breathing assistance (mechanical ventilation).      •Hyperbaric oxygen therapy. This may be done for severe carbon monoxide poisoning and involves being in a pressurized chamber where you will breathe nearly 100% oxygen.      You may also need to take medicines, such as:  •Bronchodilators taken with a nebulizer or inhaler.      •A cyanide antidote. This is a specific medicine for the treatment of cyanide poisoning.      You may be admitted to the hospital for monitoring or further care.      Follow these instructions at home:     •Take over-the-counter and prescription medicines only as told by your health care provider.      •Follow instructions from your health care provider about eating or drinking restrictions.      •Return to your normal activities as told by your health care provider. Ask your health care provider what activities are safe for you.      • Do not use any products that contain nicotine or tobacco. These products include cigarettes, chewing tobacco, and vaping devices, such as e-cigarettes. If you need help quitting, ask your health care provider.      •Keep all follow-up visits. This is important.        Contact a health care provider if:    •You have a fever or chills.      •You have a worsening cough.      •You have worsening headaches.      •You have nausea or vomiting.      •Your symptoms of smoke inhalation come back.        Get help right away if:    •You feel weak and confused.      •You have chest pain.      •You have trouble breathing.      •You feel like your voice is changing or your throat is tightening.      •You faint.      These symptoms may represent a serious problem that is an emergency. Do not wait to see if the symptoms will go away. Get medical help right away. Call your local emergency services (911 in the [place] Do not drive yourself to the hospital.       Summary    •Acute smoke inhalation happens when a person breathes in (inhales) smoke. Smoke is a harmful mixture of heated particles and gases produced by burning materials.      •Acute smoke inhalation can damage the upper and lower airways. Damage can be caused by heat, toxic chemicals, or toxic gases.      •This condition usually requires emergency treatment at a hospital.      •Treatment depends on the amount of damage to your upper airway and your lungs. Making sure there is an open airway for breathing is the most important early treatment.      •Contact a health care provider if any of your symptoms come back.      This information is not intended to replace advice given to you by your health care provider. Make sure you discuss any questions you have with your health care provider.
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
## 2023-03-15 VITALS
HEART RATE: 99 BPM | SYSTOLIC BLOOD PRESSURE: 140 MMHG | OXYGEN SATURATION: 96 % | RESPIRATION RATE: 17 BRPM | DIASTOLIC BLOOD PRESSURE: 89 MMHG

## 2023-03-31 ENCOUNTER — RX RENEWAL (OUTPATIENT)
Age: 28
End: 2023-03-31

## 2023-05-01 ENCOUNTER — RX RENEWAL (OUTPATIENT)
Age: 28
End: 2023-05-01

## 2023-06-01 ENCOUNTER — RX RENEWAL (OUTPATIENT)
Age: 28
End: 2023-06-01

## 2023-06-08 ENCOUNTER — APPOINTMENT (OUTPATIENT)
Dept: FAMILY MEDICINE | Facility: CLINIC | Age: 28
End: 2023-06-08
Payer: MEDICAID

## 2023-06-08 VITALS
WEIGHT: 293 LBS | HEIGHT: 72 IN | OXYGEN SATURATION: 98 % | SYSTOLIC BLOOD PRESSURE: 122 MMHG | TEMPERATURE: 97.3 F | BODY MASS INDEX: 39.68 KG/M2 | DIASTOLIC BLOOD PRESSURE: 82 MMHG | HEART RATE: 85 BPM

## 2023-06-08 DIAGNOSIS — Z13.220 ENCOUNTER FOR SCREENING FOR LIPOID DISORDERS: ICD-10-CM

## 2023-06-08 DIAGNOSIS — F51.02 ADJUSTMENT INSOMNIA: ICD-10-CM

## 2023-06-08 DIAGNOSIS — Z00.00 ENCOUNTER FOR GENERAL ADULT MEDICAL EXAMINATION W/OUT ABNORMAL FINDINGS: ICD-10-CM

## 2023-06-08 DIAGNOSIS — E11.9 TYPE 2 DIABETES MELLITUS W/OUT COMPLICATIONS: ICD-10-CM

## 2023-06-08 DIAGNOSIS — E55.9 VITAMIN D DEFICIENCY, UNSPECIFIED: ICD-10-CM

## 2023-06-08 DIAGNOSIS — Z13.0 ENCOUNTER FOR SCREENING FOR DISEASES OF THE BLOOD AND BLOOD-FORMING ORGANS AND CERTAIN DISORDERS INVOLVING THE IMMUNE MECHANISM: ICD-10-CM

## 2023-06-08 DIAGNOSIS — R06.83 SNORING: ICD-10-CM

## 2023-06-08 DIAGNOSIS — R53.83 OTHER FATIGUE: ICD-10-CM

## 2023-06-08 DIAGNOSIS — E66.01 MORBID (SEVERE) OBESITY DUE TO EXCESS CALORIES: ICD-10-CM

## 2023-06-08 PROCEDURE — 99395 PREV VISIT EST AGE 18-39: CPT

## 2023-06-08 RX ORDER — DULAGLUTIDE 3 MG/.5ML
3 INJECTION, SOLUTION SUBCUTANEOUS
Qty: 1 | Refills: 2 | Status: ACTIVE | COMMUNITY
Start: 2019-11-04

## 2023-06-08 RX ORDER — MELOXICAM 15 MG/1
15 TABLET ORAL
Qty: 20 | Refills: 0 | Status: COMPLETED | COMMUNITY
Start: 2022-11-28 | End: 2023-06-08

## 2023-06-08 RX ORDER — METFORMIN HYDROCHLORIDE 500 MG/1
500 TABLET, COATED ORAL DAILY
Qty: 360 | Refills: 0 | Status: ACTIVE | COMMUNITY
Start: 2018-11-05
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
## 2023-06-08 NOTE — HEALTH RISK ASSESSMENT
[Patient reported PAP Smear was normal] : Patient reported PAP Smear was normal [Never] : Never [PapSmearDate] : 05/23
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
## 2023-06-17 LAB
25(OH)D3 SERPL-MCNC: 81.4 NG/ML
ALBUMIN SERPL ELPH-MCNC: 4.5 G/DL
ALP BLD-CCNC: 91 U/L
ALT SERPL-CCNC: 20 U/L
ANION GAP SERPL CALC-SCNC: 10 MMOL/L
APPEARANCE: ABNORMAL
AST SERPL-CCNC: 14 U/L
BACTERIA: ABNORMAL /HPF
BILIRUB SERPL-MCNC: 0.2 MG/DL
BILIRUBIN URINE: NEGATIVE
BLOOD URINE: NEGATIVE
BUN SERPL-MCNC: 10 MG/DL
CALCIUM OXALATE CRYSTALS: PRESENT
CALCIUM SERPL-MCNC: 9.8 MG/DL
CAST: 2 /LPF
CHLORIDE SERPL-SCNC: 102 MMOL/L
CHOLEST SERPL-MCNC: 174 MG/DL
CO2 SERPL-SCNC: 25 MMOL/L
COLOR: YELLOW
CREAT SERPL-MCNC: 0.74 MG/DL
EGFR: 114 ML/MIN/1.73M2
EPITHELIAL CELLS: 3 /HPF
ESTIMATED AVERAGE GLUCOSE: 97 MG/DL
FOLATE SERPL-MCNC: >20 NG/ML
GLUCOSE QUALITATIVE U: NEGATIVE MG/DL
GLUCOSE SERPL-MCNC: 75 MG/DL
HBA1C MFR BLD HPLC: 5 %
HDLC SERPL-MCNC: 61 MG/DL
KETONES URINE: NEGATIVE MG/DL
LDLC SERPL CALC-MCNC: 93 MG/DL
LEUKOCYTE ESTERASE URINE: ABNORMAL
MICROSCOPIC-UA: NORMAL
NITRITE URINE: POSITIVE
NONHDLC SERPL-MCNC: 112 MG/DL
PH URINE: 6.5
POTASSIUM SERPL-SCNC: 4.4 MMOL/L
PROT SERPL-MCNC: 8 G/DL
PROTEIN URINE: NEGATIVE MG/DL
RED BLOOD CELLS URINE: NORMAL /HPF
REVIEW: NORMAL
SODIUM SERPL-SCNC: 136 MMOL/L
SPECIFIC GRAVITY URINE: 1.01
T4 FREE SERPL-MCNC: 1 NG/DL
TRIGL SERPL-MCNC: 98 MG/DL
TSH SERPL-ACNC: 3.75 UIU/ML
UROBILINOGEN URINE: 0.2 MG/DL
VIT B12 SERPL-MCNC: 325 PG/ML
WHITE BLOOD CELLS URINE: 6 /HPF

## 2023-07-10 ENCOUNTER — RX RENEWAL (OUTPATIENT)
Age: 28
End: 2023-07-10

## 2023-08-11 ENCOUNTER — OUTPATIENT (OUTPATIENT)
Dept: OUTPATIENT SERVICES | Facility: HOSPITAL | Age: 28
LOS: 1 days | End: 2023-08-11
Payer: COMMERCIAL

## 2023-08-11 DIAGNOSIS — G47.33 OBSTRUCTIVE SLEEP APNEA (ADULT) (PEDIATRIC): ICD-10-CM

## 2023-08-11 PROCEDURE — 95800 SLP STDY UNATTENDED: CPT | Mod: 26

## 2023-08-11 PROCEDURE — 95800 SLP STDY UNATTENDED: CPT

## 2023-08-15 ENCOUNTER — RX RENEWAL (OUTPATIENT)
Age: 28
End: 2023-08-15

## 2023-10-10 ENCOUNTER — RX RENEWAL (OUTPATIENT)
Age: 28
End: 2023-10-10

## 2023-10-30 ENCOUNTER — RX RENEWAL (OUTPATIENT)
Age: 28
End: 2023-10-30

## 2023-11-22 NOTE — ED ADULT NURSE NOTE - NSIMPLEMENTINTERV_GEN_ALL_ED
1- Advair 115/21 mcg 2 puffs twice a day with mask/spacer  2- Albuterol 2 puffs with mask/spacer twice a day at the first sign of a cold and then every 4 hours per the asthma action plan  3- Return in 3-4 months      Implemented All Universal Safety Interventions:  [place] to call system. Call bell, personal items and telephone within reach. Instruct patient to call for assistance. Room bathroom lighting operational. Non-slip footwear when patient is off stretcher. Physically safe environment: no spills, clutter or unnecessary equipment. Stretcher in lowest position, wheels locked, appropriate side rails in place.

## 2023-11-22 NOTE — ASSESSMENT
LVM for patient with  to call our office to schedule a consult with Dr. Morales.   [FreeTextEntry1] : DM- cont meds, f/u with endo\par UTI - start macrobid. urine cx\par fatigue, snoring - sleep study. check labs\par engerix administered, pt consent given before administration and after discussion of risks/benefits, pt tolerated well. pt to f/u in 4 months for last vaccine in series\par gardasil 9 administered, pt consent given before administration and after discussion of risks/benefits, pt tolerated well. f/u in 2 and 6 months for 2nd and 3rd vaccines in series\par morbid obesity - Advised lifestyle modifications for wt loss. Healthy diet and regular exercise regimen discussed w/ pt.\par Healthy diet and regular exercise regimen discussed w/ pt.\par Screening labs ordered\par Advised routine pap smear\par Any questions call office

## 2023-11-29 ENCOUNTER — RX RENEWAL (OUTPATIENT)
Age: 28
End: 2023-11-29

## 2023-12-29 ENCOUNTER — RX RENEWAL (OUTPATIENT)
Age: 28
End: 2023-12-29

## 2024-01-27 ENCOUNTER — RX ONLY (RX ONLY)
Age: 29
End: 2024-01-27

## 2024-01-27 ENCOUNTER — OFFICE (OUTPATIENT)
Dept: URBAN - METROPOLITAN AREA CLINIC 63 | Facility: CLINIC | Age: 29
Setting detail: OPHTHALMOLOGY
End: 2024-01-27
Payer: COMMERCIAL

## 2024-01-27 DIAGNOSIS — E11.9: ICD-10-CM

## 2024-01-27 DIAGNOSIS — H52.13: ICD-10-CM

## 2024-01-27 DIAGNOSIS — H04.123: ICD-10-CM

## 2024-01-27 DIAGNOSIS — H43.393: ICD-10-CM

## 2024-01-27 PROCEDURE — 92015 DETERMINE REFRACTIVE STATE: CPT | Performed by: STUDENT IN AN ORGANIZED HEALTH CARE EDUCATION/TRAINING PROGRAM

## 2024-01-27 PROCEDURE — 92004 COMPRE OPH EXAM NEW PT 1/>: CPT | Performed by: STUDENT IN AN ORGANIZED HEALTH CARE EDUCATION/TRAINING PROGRAM

## 2024-01-27 ASSESSMENT — REFRACTION_MANIFEST
OD_AXIS: 011
OS_SPHERE: -3.50
OS_SPHERE: -3.50
OD_SPHERE: -4.75
OD_SPHERE: -5.00
OS_VA1: 20/15-
OD_AXIS: 005
OD_VA1: 20/20
OS_AXIS: 175
OD_CYLINDER: -0.25
OD_SPHERE: -4.75
OS_VA1: 20/20
OS_SPHERE: -4.00
OD_VA1: 20/15
OS_CYLINDER: -1.50
OS_AXIS: 163
OD_CYLINDER: -0.50
OU_VA: 20/15
OD_VA1: 20/15
OS_VA1: 20/15
OD_CYLINDER: -0.50
OS_CYLINDER: -2.00
OU_VA: 20/15-
OD_AXIS: 120
OS_CYLINDER: -1.50
OU_VA: 20/20
OS_AXIS: 160

## 2024-01-27 ASSESSMENT — CONFRONTATIONAL VISUAL FIELD TEST (CVF)
OS_FINDINGS: FULL
OD_FINDINGS: FULL

## 2024-01-27 ASSESSMENT — REFRACTION_AUTOREFRACTION
OS_AXIS: 158
OD_AXIS: 119
OS_CYLINDER: -1.50
OS_SPHERE: -4.00
OD_SPHERE: -5.00
OD_CYLINDER: -0.25

## 2024-01-27 ASSESSMENT — SPHEQUIV_DERIVED
OD_SPHEQUIV: -5.125
OS_SPHEQUIV: -4.5
OS_SPHEQUIV: -4.75
OS_SPHEQUIV: -4.25
OD_SPHEQUIV: -4.875
OS_SPHEQUIV: -4.75
OD_SPHEQUIV: -5
OD_SPHEQUIV: -5.25

## 2024-01-27 ASSESSMENT — REFRACTION_CURRENTRX
OD_SPHERE: -5.50
OS_SPHERE: -3.75
OS_VPRISM_DIRECTION: SV
OD_VPRISM_DIRECTION: SV
OD_AXIS: 003
OS_AXIS: 171
OS_OVR_VA: 20/
OD_OVR_VA: 20/
OD_CYLINDER: -0.50
OS_CYLINDER: -1.00

## 2024-01-27 ASSESSMENT — SUPERFICIAL PUNCTATE KERATITIS (SPK)
OS_SPK: T
OD_SPK: T

## 2024-02-07 ENCOUNTER — RX RENEWAL (OUTPATIENT)
Age: 29
End: 2024-02-07

## 2024-03-20 ENCOUNTER — RX RENEWAL (OUTPATIENT)
Age: 29
End: 2024-03-20

## 2024-03-21 RX ORDER — FLUTICASONE PROPIONATE 50 UG/1
50 SPRAY, METERED NASAL
Qty: 16 | Refills: 0 | Status: ACTIVE | COMMUNITY
Start: 2022-04-05 | End: 1900-01-01

## 2024-03-22 ENCOUNTER — APPOINTMENT (OUTPATIENT)
Dept: FAMILY MEDICINE | Facility: CLINIC | Age: 29
End: 2024-03-22

## 2024-04-16 ENCOUNTER — RX RENEWAL (OUTPATIENT)
Age: 29
End: 2024-04-16

## 2024-04-16 RX ORDER — EPINEPHRINE 0.3 MG/.3ML
0.3 INJECTION INTRAMUSCULAR
Qty: 1 | Refills: 0 | Status: ACTIVE | COMMUNITY
Start: 2018-01-25 | End: 1900-01-01

## 2024-04-18 ENCOUNTER — NON-APPOINTMENT (OUTPATIENT)
Age: 29
End: 2024-04-18

## 2024-06-24 ENCOUNTER — APPOINTMENT (OUTPATIENT)
Dept: FAMILY MEDICINE | Facility: CLINIC | Age: 29
End: 2024-06-24

## 2024-07-17 ENCOUNTER — RX RENEWAL (OUTPATIENT)
Age: 29
End: 2024-07-17

## 2024-08-08 ENCOUNTER — APPOINTMENT (OUTPATIENT)
Dept: FAMILY MEDICINE | Facility: CLINIC | Age: 29
End: 2024-08-08

## 2024-08-08 PROBLEM — J32.9 CHRONIC SINUSITIS: Status: ACTIVE | Noted: 2024-08-08

## 2024-08-08 PROCEDURE — 99214 OFFICE O/P EST MOD 30 MIN: CPT

## 2024-08-11 NOTE — HISTORY OF PRESENT ILLNESS
[de-identified] : Pt has intermittent sinusitis. Uses fluticasone prn, due to refill  Pt does report she snores often and has daytime somnolence. Pt has never had a sleep study and is morbidly obese.  Pt sees Dr. Eduard Maradiaga q 3-4 months for DM, pt on metformin 2000mg a day and trulicity 3mg q week. Pt seeing nutritionist. Pt has lost 19 lbs in past 6 mos  Pt has anaphylaxis to strawberries has epipen  Migraines have been worsened recently, more frequent in past few months due to increase in stress. Pt more anxious about finances. Pt was using imitrex and fioricet sparingly. Last migraine 2 months ago. Pt uses motrin prn but has been improved with imitrex and fioricet. Denies CP, palpitations, dyspnea, n/v.

## 2024-08-11 NOTE — HISTORY OF PRESENT ILLNESS
[de-identified] : Pt has intermittent sinusitis. Uses fluticasone prn, due to refill  Pt does report she snores often and has daytime somnolence. Pt has never had a sleep study and is morbidly obese.  Pt sees Dr. Eduard Maradiaga q 3-4 months for DM, pt on metformin 2000mg a day and trulicity 3mg q week. Pt seeing nutritionist. Pt has lost 19 lbs in past 6 mos  Pt has anaphylaxis to strawberries has epipen  Migraines have been worsened recently, more frequent in past few months due to increase in stress. Pt more anxious about finances. Pt was using imitrex and fioricet sparingly. Last migraine 2 months ago. Pt uses motrin prn but has been improved with imitrex and fioricet. Denies CP, palpitations, dyspnea, n/v.

## 2024-08-11 NOTE — ASSESSMENT
[FreeTextEntry1] : chronic sinusitis - refill fluticasone morbid obesity - Advised lifestyle modifications for wt loss. Healthy diet and regular exercise regimen discussed w/ pt. referral to nutritionist migraines refill imitrex, fioricet prn DM- check a1c Screening labs ordered

## 2024-08-30 ENCOUNTER — NON-APPOINTMENT (OUTPATIENT)
Age: 29
End: 2024-08-30

## 2024-09-02 ENCOUNTER — EMERGENCY (EMERGENCY)
Facility: HOSPITAL | Age: 29
LOS: 1 days | Discharge: HOME | End: 2024-09-02
Attending: EMERGENCY MEDICINE
Payer: COMMERCIAL

## 2024-09-02 VITALS
DIASTOLIC BLOOD PRESSURE: 82 MMHG | TEMPERATURE: 98 F | SYSTOLIC BLOOD PRESSURE: 119 MMHG | RESPIRATION RATE: 18 BRPM | HEART RATE: 105 BPM | WEIGHT: 293 LBS | OXYGEN SATURATION: 98 % | HEIGHT: 72 IN

## 2024-09-02 LAB
ALBUMIN SERPL ELPH-MCNC: 3.7 G/DL — SIGNIFICANT CHANGE UP (ref 3.3–5.2)
ALP SERPL-CCNC: 100 U/L — SIGNIFICANT CHANGE UP (ref 40–120)
ALT FLD-CCNC: 17 U/L — SIGNIFICANT CHANGE UP
ANION GAP SERPL CALC-SCNC: 11 MMOL/L — SIGNIFICANT CHANGE UP (ref 5–17)
APPEARANCE UR: CLEAR — SIGNIFICANT CHANGE UP
AST SERPL-CCNC: 16 U/L — SIGNIFICANT CHANGE UP
BACTERIA # UR AUTO: ABNORMAL /HPF
BASOPHILS # BLD AUTO: 0.03 K/UL — SIGNIFICANT CHANGE UP (ref 0–0.2)
BASOPHILS NFR BLD AUTO: 0.3 % — SIGNIFICANT CHANGE UP (ref 0–2)
BILIRUB SERPL-MCNC: 0.7 MG/DL — SIGNIFICANT CHANGE UP (ref 0.4–2)
BILIRUB UR-MCNC: NEGATIVE — SIGNIFICANT CHANGE UP
BUN SERPL-MCNC: 5.9 MG/DL — LOW (ref 8–20)
CALCIUM SERPL-MCNC: 8.7 MG/DL — SIGNIFICANT CHANGE UP (ref 8.4–10.5)
CHLORIDE SERPL-SCNC: 101 MMOL/L — SIGNIFICANT CHANGE UP (ref 96–108)
CO2 SERPL-SCNC: 23 MMOL/L — SIGNIFICANT CHANGE UP (ref 22–29)
COLOR SPEC: SIGNIFICANT CHANGE UP
CREAT SERPL-MCNC: 0.9 MG/DL — SIGNIFICANT CHANGE UP (ref 0.5–1.3)
DIFF PNL FLD: ABNORMAL
EGFR: 89 ML/MIN/1.73M2 — SIGNIFICANT CHANGE UP
EOSINOPHIL # BLD AUTO: 0 K/UL — SIGNIFICANT CHANGE UP (ref 0–0.5)
EOSINOPHIL NFR BLD AUTO: 0 % — SIGNIFICANT CHANGE UP (ref 0–6)
EPI CELLS # UR: PRESENT
FLUAV AG NPH QL: SIGNIFICANT CHANGE UP
FLUBV AG NPH QL: SIGNIFICANT CHANGE UP
GLUCOSE SERPL-MCNC: 83 MG/DL — SIGNIFICANT CHANGE UP (ref 70–99)
GLUCOSE UR QL: NEGATIVE MG/DL — SIGNIFICANT CHANGE UP
HCG SERPL-ACNC: <4 MIU/ML — SIGNIFICANT CHANGE UP
HCT VFR BLD CALC: 35.4 % — SIGNIFICANT CHANGE UP (ref 34.5–45)
HGB BLD-MCNC: 11.5 G/DL — SIGNIFICANT CHANGE UP (ref 11.5–15.5)
HIV 1 & 2 AB SERPL IA.RAPID: SIGNIFICANT CHANGE UP
HYALINE CASTS # UR AUTO: SIGNIFICANT CHANGE UP
IMM GRANULOCYTES NFR BLD AUTO: 0.6 % — SIGNIFICANT CHANGE UP (ref 0–0.9)
KETONES UR-MCNC: ABNORMAL MG/DL
LACTATE BLDV-MCNC: 1 MMOL/L — SIGNIFICANT CHANGE UP (ref 0.5–2)
LEUKOCYTE ESTERASE UR-ACNC: ABNORMAL
LIDOCAIN IGE QN: 17 U/L — LOW (ref 22–51)
LYMPHOCYTES # BLD AUTO: 0.79 K/UL — LOW (ref 1–3.3)
LYMPHOCYTES # BLD AUTO: 8.9 % — LOW (ref 13–44)
MCHC RBC-ENTMCNC: 31.5 PG — SIGNIFICANT CHANGE UP (ref 27–34)
MCHC RBC-ENTMCNC: 32.5 GM/DL — SIGNIFICANT CHANGE UP (ref 32–36)
MCV RBC AUTO: 97 FL — SIGNIFICANT CHANGE UP (ref 80–100)
MONOCYTES # BLD AUTO: 0.73 K/UL — SIGNIFICANT CHANGE UP (ref 0–0.9)
MONOCYTES NFR BLD AUTO: 8.2 % — SIGNIFICANT CHANGE UP (ref 2–14)
NEUTROPHILS # BLD AUTO: 7.25 K/UL — SIGNIFICANT CHANGE UP (ref 1.8–7.4)
NEUTROPHILS NFR BLD AUTO: 82 % — HIGH (ref 43–77)
NITRITE UR-MCNC: POSITIVE
PH UR: 6.5 — SIGNIFICANT CHANGE UP (ref 5–8)
PLATELET # BLD AUTO: 237 K/UL — SIGNIFICANT CHANGE UP (ref 150–400)
POTASSIUM SERPL-MCNC: 4 MMOL/L — SIGNIFICANT CHANGE UP (ref 3.5–5.3)
POTASSIUM SERPL-SCNC: 4 MMOL/L — SIGNIFICANT CHANGE UP (ref 3.5–5.3)
PROT SERPL-MCNC: 8 G/DL — SIGNIFICANT CHANGE UP (ref 6.6–8.7)
PROT UR-MCNC: 30 MG/DL
RBC # BLD: 3.65 M/UL — LOW (ref 3.8–5.2)
RBC # FLD: 12.1 % — SIGNIFICANT CHANGE UP (ref 10.3–14.5)
RBC CASTS # UR COMP ASSIST: 2 /HPF — SIGNIFICANT CHANGE UP (ref 0–4)
RSV RNA NPH QL NAA+NON-PROBE: SIGNIFICANT CHANGE UP
SARS-COV-2 RNA SPEC QL NAA+PROBE: SIGNIFICANT CHANGE UP
SODIUM SERPL-SCNC: 134 MMOL/L — LOW (ref 135–145)
SP GR SPEC: 1.02 — SIGNIFICANT CHANGE UP (ref 1–1.03)
UROBILINOGEN FLD QL: 1 MG/DL — SIGNIFICANT CHANGE UP (ref 0.2–1)
WBC # BLD: 8.85 K/UL — SIGNIFICANT CHANGE UP (ref 3.8–10.5)
WBC # FLD AUTO: 8.85 K/UL — SIGNIFICANT CHANGE UP (ref 3.8–10.5)
WBC UR QL: 25 /HPF — HIGH (ref 0–5)

## 2024-09-02 PROCEDURE — 83605 ASSAY OF LACTIC ACID: CPT

## 2024-09-02 PROCEDURE — 83690 ASSAY OF LIPASE: CPT

## 2024-09-02 PROCEDURE — 87186 SC STD MICRODIL/AGAR DIL: CPT

## 2024-09-02 PROCEDURE — 74177 CT ABD & PELVIS W/CONTRAST: CPT | Mod: MC

## 2024-09-02 PROCEDURE — 36415 COLL VENOUS BLD VENIPUNCTURE: CPT

## 2024-09-02 PROCEDURE — 81001 URINALYSIS AUTO W/SCOPE: CPT

## 2024-09-02 PROCEDURE — 96374 THER/PROPH/DIAG INJ IV PUSH: CPT | Mod: XU

## 2024-09-02 PROCEDURE — 84702 CHORIONIC GONADOTROPIN TEST: CPT

## 2024-09-02 PROCEDURE — 99285 EMERGENCY DEPT VISIT HI MDM: CPT

## 2024-09-02 PROCEDURE — 86703 HIV-1/HIV-2 1 RESULT ANTBDY: CPT

## 2024-09-02 PROCEDURE — 85025 COMPLETE CBC W/AUTO DIFF WBC: CPT

## 2024-09-02 PROCEDURE — 96375 TX/PRO/DX INJ NEW DRUG ADDON: CPT

## 2024-09-02 PROCEDURE — 87086 URINE CULTURE/COLONY COUNT: CPT

## 2024-09-02 PROCEDURE — 80053 COMPREHEN METABOLIC PANEL: CPT

## 2024-09-02 PROCEDURE — 99284 EMERGENCY DEPT VISIT MOD MDM: CPT | Mod: 25

## 2024-09-02 PROCEDURE — 74177 CT ABD & PELVIS W/CONTRAST: CPT | Mod: 26,MC

## 2024-09-02 PROCEDURE — 87637 SARSCOV2&INF A&B&RSV AMP PRB: CPT

## 2024-09-02 PROCEDURE — 86803 HEPATITIS C AB TEST: CPT

## 2024-09-02 RX ORDER — CEFPODOXIME PROXETIL 100 MG/5ML
1 GRANULE, FOR SUSPENSION ORAL
Qty: 14 | Refills: 0
Start: 2024-09-02 | End: 2024-09-08

## 2024-09-02 RX ORDER — ACETAMINOPHEN 325 MG/1
1000 TABLET ORAL ONCE
Refills: 0 | Status: COMPLETED | OUTPATIENT
Start: 2024-09-02 | End: 2024-09-02

## 2024-09-02 RX ORDER — SODIUM CHLORIDE 9 MG/ML
1000 INJECTION INTRAMUSCULAR; INTRAVENOUS; SUBCUTANEOUS ONCE
Refills: 0 | Status: COMPLETED | OUTPATIENT
Start: 2024-09-02 | End: 2024-09-02

## 2024-09-02 RX ORDER — FAMOTIDINE 10 MG/ML
20 INJECTION INTRAVENOUS ONCE
Refills: 0 | Status: COMPLETED | OUTPATIENT
Start: 2024-09-02 | End: 2024-09-02

## 2024-09-02 RX ORDER — ONDANSETRON 2 MG/ML
4 INJECTION, SOLUTION INTRAMUSCULAR; INTRAVENOUS ONCE
Refills: 0 | Status: COMPLETED | OUTPATIENT
Start: 2024-09-02 | End: 2024-09-02

## 2024-09-02 RX ADMIN — Medication 1000 MILLIGRAM(S): at 18:04

## 2024-09-02 RX ADMIN — FAMOTIDINE 20 MILLIGRAM(S): 10 INJECTION INTRAVENOUS at 16:38

## 2024-09-02 RX ADMIN — ACETAMINOPHEN 1000 MILLIGRAM(S): 325 TABLET ORAL at 17:08

## 2024-09-02 RX ADMIN — SODIUM CHLORIDE 1000 MILLILITER(S): 9 INJECTION INTRAMUSCULAR; INTRAVENOUS; SUBCUTANEOUS at 16:38

## 2024-09-02 RX ADMIN — ONDANSETRON 4 MILLIGRAM(S): 2 INJECTION, SOLUTION INTRAMUSCULAR; INTRAVENOUS at 16:38

## 2024-09-02 RX ADMIN — ACETAMINOPHEN 400 MILLIGRAM(S): 325 TABLET ORAL at 16:38

## 2024-09-02 NOTE — ED PROVIDER NOTE - PATIENT PORTAL LINK FT
You can access the FollowMyHealth Patient Portal offered by [facility] by registering at the following website: [email or web address]. By joining [facility]’s FollowMyHealth portal, you will also be able to view your health information using other applications (apps) compatible with our system.

## 2024-09-02 NOTE — ED PROVIDER NOTE - CLINICAL SUMMARY MEDICAL DECISION MAKING FREE TEXT BOX
(MIRELLA Bryant MD) Initial Assessment: 28yoF p/w abd pain, nausea, fever, back pain. will do labs, ct, ua, re-eval.       ACP to complete summary of medical encounter below.  Summary of Clinical Encounter:

## 2024-09-02 NOTE — ED ADULT NURSE NOTE - OBJECTIVE STATEMENT
Pt A+Ox4 c/o ABD pain and nausea that started Saturday. Pt states that the ABD pain is in the lower ABD and describes the pain as a squeezing cramping sensation. Respirations are equal and unlabored on room air, pt speaking complete coherent sentences. Pt states that the pain takes her breath away at times. Pt denies SOB or CP at this time. Pt denies HA, V/D. Pt states + dizziness, and + fevers and body aches,

## 2024-09-02 NOTE — ED PROVIDER NOTE - ATTENDING APP SHARED VISIT CONTRIBUTION OF CARE
I performed the initial face to face bedside interview with this patient regarding history of present illness, review of symptoms and past medical, social and family history.  I completed an independent physical examination.  I was the initial provider who evaluated this patient.   I have signed out the follow up of any pending tests (i.e. labs, radiological studies) to the ACP.  I have discussed the patient’s plan of care and disposition with the ACP.  \

## 2024-09-02 NOTE — ED ADULT NURSE NOTE - CHIEF COMPLAINT QUOTE
Detail Level: Simple pt c/o left side upper & lower abdominal pain, onset Saturday am, denies VD  A&Ox3, resp wnl, slight nausea

## 2024-09-02 NOTE — ED PROVIDER NOTE - NS ED ROS FT
Constitutional: (+) fever  (-)chills  (-)sweats  Eyes/ENT: (-)   Cardiovascular: (-) chest pain, (-) palpitations (-) edema   Respiratory: (-) cough, (-) shortness of breath   Gastrointestinal: (+)nausea  (-)vomiting, (-) diarrhea  (+) abdominal pain   :  (-)dysuria, (-)frequency, (-)urgency, (-)hematuria  Musculoskeletal: , (+) back pain, (-) joint pain  Integumentary: (-) rash, (-) edema  Neurological: (+) headache, (-) altered mental status  (-)LOC

## 2024-09-02 NOTE — ED PROVIDER NOTE - OBJECTIVE STATEMENT
28-year-old female; PMH significant for DM; now presenting with abdominal pain–left lower quadrant, radiating to left flank, associated with nausea x 3 days.  Denies any vomiting.  Reports pain going up the back, x 2 days associated with headache.  Denies numbness or tingling.  Reports fever of 101 yesterday.  Complaining of cough for the past week.  Denies dysuria, frequency, urgency.

## 2024-09-02 NOTE — ED PROVIDER NOTE - PROGRESS NOTE DETAILS
UA c/w UTI  CT c/w Pyelo  Given ceftriaxone in ED  No leukocytosis, pt is well appearing  Given rx vantin   Has PCP for f/u  Strict return precautions discussed

## 2024-09-02 NOTE — ED PROVIDER NOTE - PHYSICAL EXAMINATION
General:     NAD  Head:     NC/AT, EOMI, oral mucosa moist  Neck:     trachea midline  Lungs:     CTA b/l, no w/r/r  CVS:     S1S2, RRR, no m/g/r  Abd:     +BS, s/nd, no organomegaly. ttp @ [facility]. no rebound

## 2024-09-03 LAB
HCV AB S/CO SERPL IA: 0.12 S/CO — SIGNIFICANT CHANGE UP (ref 0–0.99)
HCV AB SERPL-IMP: SIGNIFICANT CHANGE UP

## 2024-09-03 RX ORDER — CEFDINIR 300 MG/1
1 CAPSULE ORAL
Qty: 14 | Refills: 0
Start: 2024-09-03 | End: 2024-09-09

## 2024-09-03 RX ORDER — CEFPODOXIME PROXETIL 100 MG/5ML
1 GRANULE, FOR SUSPENSION ORAL
Qty: 14 | Refills: 0
Start: 2024-09-03 | End: 2024-09-09

## 2024-09-09 ENCOUNTER — INPATIENT (INPATIENT)
Facility: HOSPITAL | Age: 29
LOS: 1 days | Discharge: HOME | DRG: 193 | End: 2024-09-11
Attending: STUDENT IN AN ORGANIZED HEALTH CARE EDUCATION/TRAINING PROGRAM | Admitting: STUDENT IN AN ORGANIZED HEALTH CARE EDUCATION/TRAINING PROGRAM
Payer: COMMERCIAL

## 2024-09-09 VITALS
RESPIRATION RATE: 20 BRPM | HEART RATE: 105 BPM | SYSTOLIC BLOOD PRESSURE: 129 MMHG | HEIGHT: 72 IN | WEIGHT: 280.87 LBS | TEMPERATURE: 98 F | OXYGEN SATURATION: 95 % | DIASTOLIC BLOOD PRESSURE: 77 MMHG

## 2024-09-09 DIAGNOSIS — J18.9 PNEUMONIA, UNSPECIFIED ORGANISM: ICD-10-CM

## 2024-09-09 LAB
ALBUMIN SERPL ELPH-MCNC: 3.7 G/DL — SIGNIFICANT CHANGE UP (ref 3.3–5.2)
ALP SERPL-CCNC: 212 U/L — HIGH (ref 40–120)
ALT FLD-CCNC: 35 U/L — HIGH
ANION GAP SERPL CALC-SCNC: 13 MMOL/L — SIGNIFICANT CHANGE UP (ref 5–17)
ANION GAP SERPL CALC-SCNC: 15 MMOL/L — SIGNIFICANT CHANGE UP (ref 5–17)
APPEARANCE UR: CLEAR — SIGNIFICANT CHANGE UP
APTT BLD: 31.9 SEC — SIGNIFICANT CHANGE UP (ref 24.5–35.6)
AST SERPL-CCNC: 26 U/L — SIGNIFICANT CHANGE UP
BACTERIA # UR AUTO: NEGATIVE /HPF — SIGNIFICANT CHANGE UP
BASOPHILS # BLD AUTO: 0.02 K/UL — SIGNIFICANT CHANGE UP (ref 0–0.2)
BASOPHILS NFR BLD AUTO: 0.3 % — SIGNIFICANT CHANGE UP (ref 0–2)
BILIRUB SERPL-MCNC: 0.5 MG/DL — SIGNIFICANT CHANGE UP (ref 0.4–2)
BILIRUB UR-MCNC: NEGATIVE — SIGNIFICANT CHANGE UP
BUN SERPL-MCNC: 6 MG/DL — LOW (ref 8–20)
BUN SERPL-MCNC: 6.7 MG/DL — LOW (ref 8–20)
CALCIUM SERPL-MCNC: 9 MG/DL — SIGNIFICANT CHANGE UP (ref 8.4–10.5)
CALCIUM SERPL-MCNC: 9.4 MG/DL — SIGNIFICANT CHANGE UP (ref 8.4–10.5)
CHLORIDE SERPL-SCNC: 96 MMOL/L — SIGNIFICANT CHANGE UP (ref 96–108)
CHLORIDE SERPL-SCNC: 98 MMOL/L — SIGNIFICANT CHANGE UP (ref 96–108)
CO2 SERPL-SCNC: 22 MMOL/L — SIGNIFICANT CHANGE UP (ref 22–29)
CO2 SERPL-SCNC: 23 MMOL/L — SIGNIFICANT CHANGE UP (ref 22–29)
COLOR SPEC: YELLOW — SIGNIFICANT CHANGE UP
CREAT SERPL-MCNC: 0.77 MG/DL — SIGNIFICANT CHANGE UP (ref 0.5–1.3)
CREAT SERPL-MCNC: 0.94 MG/DL — SIGNIFICANT CHANGE UP (ref 0.5–1.3)
DIFF PNL FLD: NEGATIVE — SIGNIFICANT CHANGE UP
EGFR: 108 ML/MIN/1.73M2 — SIGNIFICANT CHANGE UP
EGFR: 85 ML/MIN/1.73M2 — SIGNIFICANT CHANGE UP
EOSINOPHIL # BLD AUTO: 0.01 K/UL — SIGNIFICANT CHANGE UP (ref 0–0.5)
EOSINOPHIL NFR BLD AUTO: 0.1 % — SIGNIFICANT CHANGE UP (ref 0–6)
GAS PNL BLDV: SIGNIFICANT CHANGE UP
GLUCOSE BLDC GLUCOMTR-MCNC: 163 MG/DL — HIGH (ref 70–99)
GLUCOSE BLDC GLUCOMTR-MCNC: 87 MG/DL — SIGNIFICANT CHANGE UP (ref 70–99)
GLUCOSE BLDC GLUCOMTR-MCNC: 89 MG/DL — SIGNIFICANT CHANGE UP (ref 70–99)
GLUCOSE BLDC GLUCOMTR-MCNC: 89 MG/DL — SIGNIFICANT CHANGE UP (ref 70–99)
GLUCOSE SERPL-MCNC: 86 MG/DL — SIGNIFICANT CHANGE UP (ref 70–99)
GLUCOSE SERPL-MCNC: 91 MG/DL — SIGNIFICANT CHANGE UP (ref 70–99)
GLUCOSE UR QL: NEGATIVE MG/DL — SIGNIFICANT CHANGE UP
HCG SERPL-ACNC: <4 MIU/ML — SIGNIFICANT CHANGE UP
HCT VFR BLD CALC: 31.1 % — LOW (ref 34.5–45)
HCT VFR BLD CALC: 32.8 % — LOW (ref 34.5–45)
HGB BLD-MCNC: 10.4 G/DL — LOW (ref 11.5–15.5)
HGB BLD-MCNC: 10.9 G/DL — LOW (ref 11.5–15.5)
HIV 1 & 2 AB SERPL IA.RAPID: SIGNIFICANT CHANGE UP
IMM GRANULOCYTES NFR BLD AUTO: 0.8 % — SIGNIFICANT CHANGE UP (ref 0–0.9)
INR BLD: 1.17 RATIO — SIGNIFICANT CHANGE UP (ref 0.85–1.18)
KETONES UR-MCNC: NEGATIVE MG/DL — SIGNIFICANT CHANGE UP
LEUKOCYTE ESTERASE UR-ACNC: ABNORMAL
LYMPHOCYTES # BLD AUTO: 1.31 K/UL — SIGNIFICANT CHANGE UP (ref 1–3.3)
LYMPHOCYTES # BLD AUTO: 17.5 % — SIGNIFICANT CHANGE UP (ref 13–44)
MCHC RBC-ENTMCNC: 31.1 PG — SIGNIFICANT CHANGE UP (ref 27–34)
MCHC RBC-ENTMCNC: 31.4 PG — SIGNIFICANT CHANGE UP (ref 27–34)
MCHC RBC-ENTMCNC: 33.2 GM/DL — SIGNIFICANT CHANGE UP (ref 32–36)
MCHC RBC-ENTMCNC: 33.4 GM/DL — SIGNIFICANT CHANGE UP (ref 32–36)
MCV RBC AUTO: 93.4 FL — SIGNIFICANT CHANGE UP (ref 80–100)
MCV RBC AUTO: 94 FL — SIGNIFICANT CHANGE UP (ref 80–100)
MONOCYTES # BLD AUTO: 0.36 K/UL — SIGNIFICANT CHANGE UP (ref 0–0.9)
MONOCYTES NFR BLD AUTO: 4.8 % — SIGNIFICANT CHANGE UP (ref 2–14)
NEUTROPHILS # BLD AUTO: 5.74 K/UL — SIGNIFICANT CHANGE UP (ref 1.8–7.4)
NEUTROPHILS NFR BLD AUTO: 76.5 % — SIGNIFICANT CHANGE UP (ref 43–77)
NITRITE UR-MCNC: NEGATIVE — SIGNIFICANT CHANGE UP
OSMOLALITY UR: 277 MOSM/KG — LOW (ref 300–1000)
PH UR: 6.5 — SIGNIFICANT CHANGE UP (ref 5–8)
PLATELET # BLD AUTO: 397 K/UL — SIGNIFICANT CHANGE UP (ref 150–400)
PLATELET # BLD AUTO: 410 K/UL — HIGH (ref 150–400)
POTASSIUM SERPL-MCNC: 3.6 MMOL/L — SIGNIFICANT CHANGE UP (ref 3.5–5.3)
POTASSIUM SERPL-MCNC: 3.9 MMOL/L — SIGNIFICANT CHANGE UP (ref 3.5–5.3)
POTASSIUM SERPL-SCNC: 3.6 MMOL/L — SIGNIFICANT CHANGE UP (ref 3.5–5.3)
POTASSIUM SERPL-SCNC: 3.9 MMOL/L — SIGNIFICANT CHANGE UP (ref 3.5–5.3)
PROT SERPL-MCNC: 8.4 G/DL — SIGNIFICANT CHANGE UP (ref 6.6–8.7)
PROT UR-MCNC: NEGATIVE MG/DL — SIGNIFICANT CHANGE UP
PROTHROM AB SERPL-ACNC: 12.9 SEC — SIGNIFICANT CHANGE UP (ref 9.5–13)
RAPID RVP RESULT: SIGNIFICANT CHANGE UP
RBC # BLD: 3.31 M/UL — LOW (ref 3.8–5.2)
RBC # BLD: 3.51 M/UL — LOW (ref 3.8–5.2)
RBC # FLD: 11.9 % — SIGNIFICANT CHANGE UP (ref 10.3–14.5)
RBC # FLD: 11.9 % — SIGNIFICANT CHANGE UP (ref 10.3–14.5)
RBC CASTS # UR COMP ASSIST: 1 /HPF — SIGNIFICANT CHANGE UP (ref 0–4)
SARS-COV-2 RNA SPEC QL NAA+PROBE: SIGNIFICANT CHANGE UP
SODIUM SERPL-SCNC: 132 MMOL/L — LOW (ref 135–145)
SODIUM SERPL-SCNC: 135 MMOL/L — SIGNIFICANT CHANGE UP (ref 135–145)
SODIUM UR-SCNC: <30 MMOL/L — SIGNIFICANT CHANGE UP
SP GR SPEC: 1.01 — SIGNIFICANT CHANGE UP (ref 1–1.03)
SQUAMOUS # UR AUTO: 0 /HPF — SIGNIFICANT CHANGE UP (ref 0–5)
TROPONIN T, HIGH SENSITIVITY RESULT: <6 NG/L — SIGNIFICANT CHANGE UP (ref 0–51)
UROBILINOGEN FLD QL: 1 MG/DL — SIGNIFICANT CHANGE UP (ref 0.2–1)
WBC # BLD: 7.5 K/UL — SIGNIFICANT CHANGE UP (ref 3.8–10.5)
WBC # BLD: 8.67 K/UL — SIGNIFICANT CHANGE UP (ref 3.8–10.5)
WBC # FLD AUTO: 7.5 K/UL — SIGNIFICANT CHANGE UP (ref 3.8–10.5)
WBC # FLD AUTO: 8.67 K/UL — SIGNIFICANT CHANGE UP (ref 3.8–10.5)
WBC UR QL: 0 /HPF — SIGNIFICANT CHANGE UP (ref 0–5)

## 2024-09-09 PROCEDURE — 99285 EMERGENCY DEPT VISIT HI MDM: CPT | Mod: 25

## 2024-09-09 PROCEDURE — 71275 CT ANGIOGRAPHY CHEST: CPT | Mod: 26

## 2024-09-09 PROCEDURE — 93010 ELECTROCARDIOGRAM REPORT: CPT

## 2024-09-09 PROCEDURE — 99223 1ST HOSP IP/OBS HIGH 75: CPT

## 2024-09-09 PROCEDURE — 71046 X-RAY EXAM CHEST 2 VIEWS: CPT | Mod: 26

## 2024-09-09 RX ORDER — ONDANSETRON 2 MG/ML
4 INJECTION, SOLUTION INTRAMUSCULAR; INTRAVENOUS EVERY 8 HOURS
Refills: 0 | Status: DISCONTINUED | OUTPATIENT
Start: 2024-09-09 | End: 2024-09-11

## 2024-09-09 RX ORDER — AZITHROMYCIN 500 MG/1
500 TABLET, FILM COATED ORAL ONCE
Refills: 0 | Status: COMPLETED | OUTPATIENT
Start: 2024-09-09 | End: 2024-09-09

## 2024-09-09 RX ORDER — ACETAMINOPHEN 325 MG/1
650 TABLET ORAL EVERY 6 HOURS
Refills: 0 | Status: DISCONTINUED | OUTPATIENT
Start: 2024-09-09 | End: 2024-09-11

## 2024-09-09 RX ORDER — DEXTROSE 15 G/33 G
15 GEL IN PACKET (GRAM) ORAL ONCE
Refills: 0 | Status: DISCONTINUED | OUTPATIENT
Start: 2024-09-09 | End: 2024-09-11

## 2024-09-09 RX ORDER — MAGNESIUM, ALUMINUM HYDROXIDE 200-225/5
30 SUSPENSION, ORAL (FINAL DOSE FORM) ORAL EVERY 4 HOURS
Refills: 0 | Status: DISCONTINUED | OUTPATIENT
Start: 2024-09-09 | End: 2024-09-11

## 2024-09-09 RX ORDER — METFORMIN HYDROCHLORIDE 850 MG/1
4 TABLET, FILM COATED ORAL
Refills: 0 | DISCHARGE

## 2024-09-09 RX ORDER — AZITHROMYCIN 500 MG/1
500 TABLET, FILM COATED ORAL EVERY 24 HOURS
Refills: 0 | Status: DISCONTINUED | OUTPATIENT
Start: 2024-09-10 | End: 2024-09-11

## 2024-09-09 RX ORDER — DULAGLUTIDE 1.5 MG/.5ML
3 INJECTION, SOLUTION SUBCUTANEOUS
Refills: 0 | DISCHARGE

## 2024-09-09 RX ORDER — DEXTROSE 15 G/33 G
25 GEL IN PACKET (GRAM) ORAL ONCE
Refills: 0 | Status: DISCONTINUED | OUTPATIENT
Start: 2024-09-09 | End: 2024-09-11

## 2024-09-09 RX ORDER — GLUCAGON INJECTION, SOLUTION 1 MG/.2ML
1 INJECTION, SOLUTION SUBCUTANEOUS ONCE
Refills: 0 | Status: DISCONTINUED | OUTPATIENT
Start: 2024-09-09 | End: 2024-09-11

## 2024-09-09 RX ORDER — DEXTROSE 15 G/33 G
12.5 GEL IN PACKET (GRAM) ORAL ONCE
Refills: 0 | Status: DISCONTINUED | OUTPATIENT
Start: 2024-09-09 | End: 2024-09-11

## 2024-09-09 RX ADMIN — Medication 2.5 MILLIGRAM(S): at 02:01

## 2024-09-09 RX ADMIN — AZITHROMYCIN 255 MILLIGRAM(S): 500 TABLET, FILM COATED ORAL at 02:01

## 2024-09-09 RX ADMIN — Medication 2.5 MILLIGRAM(S): at 02:20

## 2024-09-09 RX ADMIN — Medication 1000 MILLIGRAM(S): at 02:01

## 2024-09-09 NOTE — PATIENT PROFILE ADULT - FALL HARM RISK - RISK INTERVENTIONS

## 2024-09-09 NOTE — H&P ADULT - TIME BILLING
chart, labs and imaging reviewed. Physical examination, medication reconciliation and documentation. Discussion with patient  and ED attending.

## 2024-09-09 NOTE — ED PROVIDER NOTE - CARE PLAN
Principal Discharge DX:	Right lower lobe pneumonia   1 Principal Discharge DX:	Right lower lobe pneumonia  Secondary Diagnosis:	Hypoxia

## 2024-09-09 NOTE — ED ADULT TRIAGE NOTE - ESI TRIAGE ACUITY LEVEL, MLM
3
86 yo female, complicated social issue related to 's illness (please see documentation from yesterday's hospitalization), rejected from nursing home today reportedly for insurance issues.  Will remain here overnight and dispo in AM.

## 2024-09-09 NOTE — ED ADULT NURSE NOTE - NSSUHOSCREENINGYN_ED_ALL_ED
[FreeTextEntry3] : Traci Rodríguez MD\par Breast Surgeon\par Division of Surgical Oncology\par Department of Surgery\par [address] [place] [address]\[place] [address] \par Tel: [phone]\par Fax: [phone]\par Email: [email or web address]  Yes - the patient is able to be screened

## 2024-09-09 NOTE — ED ADULT NURSE REASSESSMENT NOTE - NS ED NURSE REASSESS COMMENT FT1
pt walked on continuous o2 monitoring, pt desatted to 73% on RA with HR increase to 139. Pt encouraged to sit and breathe which brought VS up to 95% RA RR20 , MD Gomez notified pending orders for labs pt to xray at this time NAD noted
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
## 2024-09-09 NOTE — CHART NOTE - NSCHARTNOTEFT_GEN_A_CORE
Pt is admitted earlier today with pneumonia and UTI   urine cx e coli from ED visit     pt is feeling better right sided flank pain when cough   Vital Signs Last 24 Hrs  T(C): 36.9 (09-09-24 @ 07:11), Max: 36.9 (09-09-24 @ 07:11)  T(F): 98.5 (09-09-24 @ 07:11), Max: 98.5 (09-09-24 @ 07:11)  HR: 95 (09-09-24 @ 07:11) (95 - 139)  BP: 101/71 (09-09-24 @ 07:11) (100/76 - 129/77)  BP(mean): --  RR: 18 (09-09-24 @ 07:11) (16 - 22)  SpO2: 94% (09-09-24 @ 07:11) (73% - 98%)

## 2024-09-09 NOTE — ED PROVIDER NOTE - OBJECTIVE STATEMENT
29 yo female with pmhx of PCOS, lymphedema, and "pre-diabetes" presents with worsening productive cough and significant exertional dyspnea for several days.

## 2024-09-09 NOTE — ED PROVIDER NOTE - CLINICAL SUMMARY MEDICAL DECISION MAKING FREE TEXT BOX
29 yo female with pmhx of PCOS, lymphedema, and "pre-diabetes" presents with worsening productive cough and significant exertional dyspnea for several days.    Patient with significant hypoxia on exertion, down to 70's. Patient found to have focal right lobe infiltrate on examination and confirmed on cxr.

## 2024-09-09 NOTE — H&P ADULT - ASSESSMENT
27 y/o female with PMH of PCOS, lymphedema, DM-2 came to the ED complaining productive cough associated with shortness of breath with exertion and pleuritic chest pain x 1 week. In the ED, patient was hypoxic with ambulation. CTA chest done to rule out PE as patient is on OCP and also obese. Right middle/right lower lobe pneumonia suspected; small right pleural effusion; PE ruled out     Acute respiratory failure with hypoxia 2/2 PNA-Right middle and lower lobes   Admit to medical floor with    CTA as noted above  Legionella and mycoplasma PCR ordered, follow up   Oxygen therapy as needed   Incentive spirometry   Rocephin and Azithromycin  given in the ED, will continue     Hyponatremia   Na: 132  Etiology? will check legionella   Serum/urine osmo   Urine lytes ordered, follow up   Monitor BMP     DM-2   On Metformin 2000mg HS   Trulicity weekly   Insulin sliding scale     Hx of PCOS   On OCP   Metformin     Supportive   DVT prophylaxis: Lovenox 40mg   Diet: CHO     Plan of care discussed with patient and ER attending.       Dispo: when stable, home.

## 2024-09-09 NOTE — ED ADULT NURSE NOTE - ED STAT RN HANDOFF DETAILS
pt stable NAD Noted VSS on RA as noted pt currently denies complaints HR given to Love HIGGINS updated on POC and accepted pt transported wtihout issues fall and safety precautions maintained

## 2024-09-09 NOTE — ED ADULT TRIAGE NOTE - CHIEF COMPLAINT QUOTE
I was here last monday for UTI & Kidney infection, with Rx of antibiotic, today c/o cough & SOB, has been taking OTC cough medications but its not helping

## 2024-09-09 NOTE — ED ADULT NURSE NOTE - OBJECTIVE STATEMENT
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

## 2024-09-09 NOTE — H&P ADULT - HISTORY OF PRESENT ILLNESS
27 y/o female with PMH of PCOS, lymphedema, DM-2 came to the ED complaining cough x 1 week. She reported productive cough but did not pay attention to the color of the sputum. Patient also noted associated shortness of breath with exertion, pleuritic chest pain and nausea. She reported increase BM which she attributed to antibiotic use. Of note, patient was seen in the ED on 09/02/24 for pyelonephritis, discharged home on antibiotic. She has no fever, chills, abdominal pain, hematuria, melena, recent travel, calf pain.

## 2024-09-10 ENCOUNTER — TRANSCRIPTION ENCOUNTER (OUTPATIENT)
Age: 29
End: 2024-09-10

## 2024-09-10 LAB
GLUCOSE BLDC GLUCOMTR-MCNC: 105 MG/DL — HIGH (ref 70–99)
GLUCOSE BLDC GLUCOMTR-MCNC: 110 MG/DL — HIGH (ref 70–99)
GLUCOSE BLDC GLUCOMTR-MCNC: 85 MG/DL — SIGNIFICANT CHANGE UP (ref 70–99)
GLUCOSE BLDC GLUCOMTR-MCNC: 89 MG/DL — SIGNIFICANT CHANGE UP (ref 70–99)
LEGIONELLA AG UR QL: NEGATIVE — SIGNIFICANT CHANGE UP
OSMOLALITY SERPL: 291 MOSMOL/KG — SIGNIFICANT CHANGE UP (ref 275–300)

## 2024-09-10 PROCEDURE — 99232 SBSQ HOSP IP/OBS MODERATE 35: CPT

## 2024-09-10 RX ADMIN — Medication 1000 MILLIGRAM(S): at 01:22

## 2024-09-10 RX ADMIN — AZITHROMYCIN 255 MILLIGRAM(S): 500 TABLET, FILM COATED ORAL at 01:21

## 2024-09-10 NOTE — PROGRESS NOTE ADULT - ASSESSMENT
27 y/o female with PMH of PCOS, lymphedema, DM-2 came to the ED complaining productive cough associated with shortness of breath with exertion and pleuritic chest pain x 1 week. In the ED, patient was hypoxic with ambulation. CTA chest done to rule out PE as patient is on OCP and also obese. Right middle/right lower lobe pneumonia suspected; small right pleural effusion; PE ruled out     #Acute respiratory failure with hypoxia 2/2 PNA-Right middle and lower lobes   - Legionella and mycoplasma PCR ordered, follow up   - wean o2   - Incentive spirometry   - c/w ctx and azithro for CAP    #Hyponatremia, resolved   - Na: 132 -> 135    #DM-2   - On Metformin 2000mg HS   - Trulicity weekly at home   - Insulin sliding scale     #Hx of PCOS   - On OCP   - Metformin     Supportive   DVT prophylaxis: Lovenox 40mg       Dispo: d/c tomorrow if remains afebrile, ambulating without desats

## 2024-09-10 NOTE — DISCHARGE NOTE NURSING/CASE MANAGEMENT/SOCIAL WORK - PATIENT PORTAL LINK FT
You can access the FollowMyHealth Patient Portal offered by [facility] by registering at the following website: [email or web address]. By joining [employer]’s FollowMyHealth portal, you will also be able to view your health information using other applications (apps) compatible with our system.

## 2024-09-10 NOTE — DISCHARGE NOTE NURSING/CASE MANAGEMENT/SOCIAL WORK - NSDCPEFALRISK_GEN_ALL_CORE
For information on Fall & Injury Prevention, visit: [email or web address] OR  [email or web address] OR  https://www.cdc.gov/steadi/patient.html

## 2024-09-10 NOTE — PROGRESS NOTE ADULT - SUBJECTIVE AND OBJECTIVE BOX
[facility] Division of Hospital Medicine    INTERVAL HISTORY:  Overnight, no acute events.     Patient seen and examined at bedside this morning. Patient denies chest pain, SOB, abd pain, N/V, fever, chills, dysuria or any other complaints. All remainder ROS negative.     MEDICATIONS  (STANDING):  azithromycin  IVPB 500 milliGRAM(s) IV Intermittent every 24 hours  cefTRIAXone Injectable. 1000 milliGRAM(s) IV Push every 24 hours  dextrose 5%. 1000 milliLiter(s) (50 mL/Hr) IV Continuous <Continuous>  dextrose 5%. 1000 milliLiter(s) (100 mL/Hr) IV Continuous <Continuous>  dextrose 50% Injectable 12.5 Gram(s) IV Push once  dextrose 50% Injectable 25 Gram(s) IV Push once  dextrose 50% Injectable 25 Gram(s) IV Push once  glucagon  Injectable 1 milliGRAM(s) IntraMuscular once  insulin lispro (ADMELOG) corrective regimen sliding scale   SubCutaneous three times a day before meals  insulin lispro (ADMELOG) corrective regimen sliding scale   SubCutaneous at bedtime    MEDICATIONS  (PRN):  acetaminophen     Tablet .. 650 milliGRAM(s) Oral every 6 hours PRN Temp greater or equal to 38C (100.4F), Mild Pain (1 - 3)  aluminum hydroxide/magnesium hydroxide/simethicone Suspension 30 milliLiter(s) Oral every 4 hours PRN Dyspepsia  dextrose Oral Gel 15 Gram(s) Oral once PRN Blood Glucose LESS THAN 70 milliGRAM(s)/deciliter  melatonin 3 milliGRAM(s) Oral at bedtime PRN Insomnia  ondansetron Injectable 4 milliGRAM(s) IV Push every 8 hours PRN Nausea and/or Vomiting        I&O's Summary      PHYSICAL EXAM:  Vital Signs Last 24 Hrs  T(C): 36.7 (10 Sep 2024 05:07), Max: 36.8 (09 Sep 2024 19:29)  T(F): 98 (10 Sep 2024 05:07), Max: 98.2 (09 Sep 2024 19:29)  HR: 82 (10 Sep 2024 05:07) (82 - 100)  BP: 113/73 (10 Sep 2024 05:07) (101/62 - 113/73)  BP(mean): --  RR: 18 (10 Sep 2024 05:07) (18 - 19)  SpO2: 95% (10 Sep 2024 05:07) (93% - 97%)    Parameters below as of 10 Sep 2024 05:07  Patient On (Oxygen Delivery Method): room air          CONSTITUTIONAL: No apparent distress  HEENT: Normocephalic, Atraumatic. Trachea midline.  RESPIRATORY:  lungs are clear to auscultation bilaterally, breath sounds equal bilaterally. No crackles, rhonchi, wheezes  CARDIOVASCULAR: Regular rate and rhythm, normal S1 and S2, no murmur/rub/gallop; No lower extremity edema; Peripheral pulses are 2+ bilaterally  ABDOMEN: Soft, non-distended, nontender to palpation, +BS  MUSCLOSKELETAL: Normal gait; moving all 4 extremities spontaneously  PSYCH: thoughts linear, affect appropriate  NEUROLOGY: Alert, Oriented x3, CN 2-12 grossly intact  SKIN: No rashes    LABS:                        10.4   8.67  )-----------( 397      ( 09 Sep 2024 05:20 )             31.1     09-09    135  |  98  |  6.0<L>  ----------------------------<  91  3.9   |  22.0  |  0.77    Ca    9.0      09 Sep 2024 05:20    TPro  8.4  /  Alb  3.7  /  TBili  0.5  /  DBili  x   /  AST  26  /  ALT  35<H>  /  AlkPhos  212<H>  09-09    PT/INR - ( 09 Sep 2024 01:57 )   PT: 12.9 sec;   INR: 1.17 ratio         PTT - ( 09 Sep 2024 01:57 )  PTT:31.9 sec      Urinalysis Basic - ( 09 Sep 2024 05:20 )    Color: x / Appearance: x / SG: x / pH: x  Gluc: 91 mg/dL / Ketone: x  / Bili: x / Urobili: x   Blood: x / Protein: x / Nitrite: x   Leuk Esterase: x / RBC: x / WBC x   Sq Epi: x / Non Sq Epi: x / Bacteria: x        Culture - Blood (collected 09 Sep 2024 01:57)  Source: .Blood Blood-Peripheral  Preliminary Report (10 Sep 2024 07:01):    No growth at 24 hours      CAPILLARY BLOOD GLUCOSE      POCT Blood Glucose.: 85 mg/dL (10 Sep 2024 07:42)  POCT Blood Glucose.: 87 mg/dL (09 Sep 2024 22:00)  POCT Blood Glucose.: 163 mg/dL (09 Sep 2024 17:01)  POCT Blood Glucose.: 89 mg/dL (09 Sep 2024 11:54)        RADIOLOGY & ADDITIONAL TESTS:  Results Reviewed     [facility] Division of Hospital Medicine    INTERVAL HISTORY:  Overnight, no acute events.     Patient seen and examined at bedside this morning. Patient denies chest pain, SOB, abd pain, N/V, fever, chills, dysuria or any other complaints.     MEDICATIONS  (STANDING):  azithromycin  IVPB 500 milliGRAM(s) IV Intermittent every 24 hours  cefTRIAXone Injectable. 1000 milliGRAM(s) IV Push every 24 hours  dextrose 5%. 1000 milliLiter(s) (50 mL/Hr) IV Continuous <Continuous>  dextrose 5%. 1000 milliLiter(s) (100 mL/Hr) IV Continuous <Continuous>  dextrose 50% Injectable 12.5 Gram(s) IV Push once  dextrose 50% Injectable 25 Gram(s) IV Push once  dextrose 50% Injectable 25 Gram(s) IV Push once  glucagon  Injectable 1 milliGRAM(s) IntraMuscular once  insulin lispro (ADMELOG) corrective regimen sliding scale   SubCutaneous three times a day before meals  insulin lispro (ADMELOG) corrective regimen sliding scale   SubCutaneous at bedtime    MEDICATIONS  (PRN):  acetaminophen     Tablet .. 650 milliGRAM(s) Oral every 6 hours PRN Temp greater or equal to 38C (100.4F), Mild Pain (1 - 3)  aluminum hydroxide/magnesium hydroxide/simethicone Suspension 30 milliLiter(s) Oral every 4 hours PRN Dyspepsia  dextrose Oral Gel 15 Gram(s) Oral once PRN Blood Glucose LESS THAN 70 milliGRAM(s)/deciliter  melatonin 3 milliGRAM(s) Oral at bedtime PRN Insomnia  ondansetron Injectable 4 milliGRAM(s) IV Push every 8 hours PRN Nausea and/or Vomiting        I&O's Summary      PHYSICAL EXAM:  Vital Signs Last 24 Hrs  T(C): 36.7 (10 Sep 2024 05:07), Max: 36.8 (09 Sep 2024 19:29)  T(F): 98 (10 Sep 2024 05:07), Max: 98.2 (09 Sep 2024 19:29)  HR: 82 (10 Sep 2024 05:07) (82 - 100)  BP: 113/73 (10 Sep 2024 05:07) (101/62 - 113/73)  BP(mean): --  RR: 18 (10 Sep 2024 05:07) (18 - 19)  SpO2: 95% (10 Sep 2024 05:07) (93% - 97%)    Parameters below as of 10 Sep 2024 05:07  Patient On (Oxygen Delivery Method): room air          CONSTITUTIONAL: No apparent distress  HEENT: Normocephalic, Atraumatic. Trachea midline.  RESPIRATORY:  lungs are clear to auscultation bilaterally, breath sounds equal bilaterally. No crackles, rhonchi, wheezes  CARDIOVASCULAR: Regular rate and rhythm, normal S1 and S2, no murmur/rub/gallop; No lower extremity edema; Peripheral pulses are 2+ bilaterally  ABDOMEN: Soft, non-distended, nontender to palpation, +BS  MUSCLOSKELETAL: Normal gait; moving all 4 extremities spontaneously  PSYCH: thoughts linear, affect appropriate  NEUROLOGY: Alert, Oriented x3, CN 2-12 grossly intact  SKIN: No rashes    LABS:                        10.4   8.67  )-----------( 397      ( 09 Sep 2024 05:20 )             31.1     09-09    135  |  98  |  6.0<L>  ----------------------------<  91  3.9   |  22.0  |  0.77    Ca    9.0      09 Sep 2024 05:20    TPro  8.4  /  Alb  3.7  /  TBili  0.5  /  DBili  x   /  AST  26  /  ALT  35<H>  /  AlkPhos  212<H>  09-09    PT/INR - ( 09 Sep 2024 01:57 )   PT: 12.9 sec;   INR: 1.17 ratio         PTT - ( 09 Sep 2024 01:57 )  PTT:31.9 sec      Urinalysis Basic - ( 09 Sep 2024 05:20 )    Color: x / Appearance: x / SG: x / pH: x  Gluc: 91 mg/dL / Ketone: x  / Bili: x / Urobili: x   Blood: x / Protein: x / Nitrite: x   Leuk Esterase: x / RBC: x / WBC x   Sq Epi: x / Non Sq Epi: x / Bacteria: x        Culture - Blood (collected 09 Sep 2024 01:57)  Source: .Blood Blood-Peripheral  Preliminary Report (10 Sep 2024 07:01):    No growth at 24 hours      CAPILLARY BLOOD GLUCOSE      POCT Blood Glucose.: 85 mg/dL (10 Sep 2024 07:42)  POCT Blood Glucose.: 87 mg/dL (09 Sep 2024 22:00)  POCT Blood Glucose.: 163 mg/dL (09 Sep 2024 17:01)  POCT Blood Glucose.: 89 mg/dL (09 Sep 2024 11:54)        RADIOLOGY & ADDITIONAL TESTS:  Results Reviewed

## 2024-09-11 ENCOUNTER — TRANSCRIPTION ENCOUNTER (OUTPATIENT)
Age: 29
End: 2024-09-11

## 2024-09-11 VITALS — WEIGHT: 292.55 LBS

## 2024-09-11 LAB
GLUCOSE BLDC GLUCOMTR-MCNC: 84 MG/DL — SIGNIFICANT CHANGE UP (ref 70–99)
GLUCOSE BLDC GLUCOMTR-MCNC: 88 MG/DL — SIGNIFICANT CHANGE UP (ref 70–99)

## 2024-09-11 PROCEDURE — 99239 HOSP IP/OBS DSCHRG MGMT >30: CPT

## 2024-09-11 PROCEDURE — 71046 X-RAY EXAM CHEST 2 VIEWS: CPT

## 2024-09-11 PROCEDURE — 83935 ASSAY OF URINE OSMOLALITY: CPT

## 2024-09-11 PROCEDURE — 85730 THROMBOPLASTIN TIME PARTIAL: CPT

## 2024-09-11 PROCEDURE — 85018 HEMOGLOBIN: CPT

## 2024-09-11 PROCEDURE — 82330 ASSAY OF CALCIUM: CPT

## 2024-09-11 PROCEDURE — 81001 URINALYSIS AUTO W/SCOPE: CPT

## 2024-09-11 PROCEDURE — 83930 ASSAY OF BLOOD OSMOLALITY: CPT

## 2024-09-11 PROCEDURE — 85027 COMPLETE CBC AUTOMATED: CPT

## 2024-09-11 PROCEDURE — 85610 PROTHROMBIN TIME: CPT

## 2024-09-11 PROCEDURE — 87449 NOS EACH ORGANISM AG IA: CPT

## 2024-09-11 PROCEDURE — 82962 GLUCOSE BLOOD TEST: CPT

## 2024-09-11 PROCEDURE — 84484 ASSAY OF TROPONIN QUANT: CPT

## 2024-09-11 PROCEDURE — 99285 EMERGENCY DEPT VISIT HI MDM: CPT | Mod: 25

## 2024-09-11 PROCEDURE — 84295 ASSAY OF SERUM SODIUM: CPT

## 2024-09-11 PROCEDURE — 80048 BASIC METABOLIC PNL TOTAL CA: CPT

## 2024-09-11 PROCEDURE — 82947 ASSAY GLUCOSE BLOOD QUANT: CPT

## 2024-09-11 PROCEDURE — 93005 ELECTROCARDIOGRAM TRACING: CPT

## 2024-09-11 PROCEDURE — 86703 HIV-1/HIV-2 1 RESULT ANTBDY: CPT

## 2024-09-11 PROCEDURE — 80053 COMPREHEN METABOLIC PANEL: CPT

## 2024-09-11 PROCEDURE — 82803 BLOOD GASES ANY COMBINATION: CPT

## 2024-09-11 PROCEDURE — 0225U NFCT DS DNA&RNA 21 SARSCOV2: CPT

## 2024-09-11 PROCEDURE — 84132 ASSAY OF SERUM POTASSIUM: CPT

## 2024-09-11 PROCEDURE — 83605 ASSAY OF LACTIC ACID: CPT

## 2024-09-11 PROCEDURE — 85014 HEMATOCRIT: CPT

## 2024-09-11 PROCEDURE — 82435 ASSAY OF BLOOD CHLORIDE: CPT

## 2024-09-11 PROCEDURE — 84300 ASSAY OF URINE SODIUM: CPT

## 2024-09-11 PROCEDURE — 96375 TX/PRO/DX INJ NEW DRUG ADDON: CPT

## 2024-09-11 PROCEDURE — 71275 CT ANGIOGRAPHY CHEST: CPT | Mod: MC

## 2024-09-11 PROCEDURE — 36415 COLL VENOUS BLD VENIPUNCTURE: CPT

## 2024-09-11 PROCEDURE — 96374 THER/PROPH/DIAG INJ IV PUSH: CPT

## 2024-09-11 PROCEDURE — 85025 COMPLETE CBC W/AUTO DIFF WBC: CPT

## 2024-09-11 PROCEDURE — 87040 BLOOD CULTURE FOR BACTERIA: CPT

## 2024-09-11 PROCEDURE — 84702 CHORIONIC GONADOTROPIN TEST: CPT

## 2024-09-11 PROCEDURE — 94640 AIRWAY INHALATION TREATMENT: CPT

## 2024-09-11 RX ORDER — AMOXICILLIN AND CLAVULANATE POTASSIUM 250; 125 MG/1; MG/1
875 TABLET, FILM COATED ORAL
Qty: 10 | Refills: 0
Start: 2024-09-11 | End: 2024-09-15

## 2024-09-11 RX ORDER — FLUCONAZOLE 150 MG/1
150 TABLET ORAL ONCE
Refills: 0 | Status: COMPLETED | OUTPATIENT
Start: 2024-09-11 | End: 2024-09-11

## 2024-09-11 RX ORDER — DIPHENHYDRAMINE HCL 50 MG
25 CAPSULE ORAL ONCE
Refills: 0 | Status: DISCONTINUED | OUTPATIENT
Start: 2024-09-11 | End: 2024-09-11

## 2024-09-11 RX ORDER — FOLIC ACID/MULTIVIT,IRON,MINER 0.4MG-18MG
1 TABLET,CHEWABLE ORAL
Refills: 0 | DISCHARGE

## 2024-09-11 RX ORDER — ACETAMINOPHEN 325 MG/1
2 TABLET ORAL
Qty: 0 | Refills: 0 | DISCHARGE
Start: 2024-09-11

## 2024-09-11 RX ADMIN — Medication 1000 MILLIGRAM(S): at 00:23

## 2024-09-11 RX ADMIN — FLUCONAZOLE 150 MILLIGRAM(S): 150 TABLET ORAL at 12:14

## 2024-09-11 RX ADMIN — AZITHROMYCIN 255 MILLIGRAM(S): 500 TABLET, FILM COATED ORAL at 00:23

## 2024-09-11 NOTE — CDI QUERY NOTE - NSCDIOTHERTXTBX_GEN_ALL_CORE_HH
The H&P and Progress note documents this patient had acute hypoxic respiratory failure that is not on the discharge summary. Clarification is needed to determine if acute hypoxic respiratory failure is a valid diagnosis this admission, such as:    -Acute hypoxic respiratory failure is a valid diagnosis this admission  -Hypoxia only  -Other    SUPPORTING DOCUMENTATION:     9/9 ED Nurse Note: ... Comments: pt walked on continuous o2 monitoring, pt desatted to 73% on RA with HR increase to 139. Pt encouraged to sit and breathe which brought VS up to 95% RA RR20 , MD Gomez notified pending orders for labs pt to xray at this time NAD noted    9/9 ED Provider Note: ... Principal Discharge DX: Right lower lobe pneumonia Secondary Diagnosis: Hypoxia.    9/9 H&P: ... complaining cough x 1 week. She reported productive cough but did not pay attention to the color of the sputum. Patient also noted associated shortness of breath with exertion, pleuritic chest pain and nausea... Right middle/right lower lobe pneumonia suspected; small right pleural effusion; PE ruled out Acute respiratory failure with hypoxia 2/2 PNA-Right middle and lower lobes...  Oxygen therapy as needed... Incentive spirometry Rocephin and Azithromycin  given in the ED, will continue     9/10 Hospitalist: ... Acute respiratory failure with hypoxia 2/2 PNA-Right middle and lower lobes     9/11 Discharge Note: ... Right lower lobe pneumonia... Hypoxia

## 2024-09-11 NOTE — DIETITIAN INITIAL EVALUATION ADULT - OTHER INFO
27 y/o female with PMH of PCOS, lymphedema, DM-2 came to the ED complaining productive cough associated with shortness of breath with exertion and pleuritic chest pain x 1 week. In the ED, patient was hypoxic with ambulation. CTA chest done to rule out PE as patient is on OCP and also obese. Right middle/right lower lobe pneumonia suspected; small right pleural effusion; PE ruled out.

## 2024-09-11 NOTE — DIETITIAN INITIAL EVALUATION ADULT - ETIOLOGY
related to decreased oral intake secondary to decreased appetite due to increased BM attributed to recent antibiotic

## 2024-09-11 NOTE — DISCHARGE NOTE PROVIDER - NSDCCPCAREPLAN_GEN_ALL_CORE_FT
PRINCIPAL DISCHARGE DIAGNOSIS  Diagnosis: Right lower lobe pneumonia  Assessment and Plan of Treatment: - Legionella and mycoplasma PCR ordered, follow up   - off oxygen  - Incentive spirometry   - c/w ctx and azithro for CAP outpt      SECONDARY DISCHARGE DIAGNOSES  Diagnosis: Hypoxia  Assessment and Plan of Treatment:     Diagnosis: DM (diabetes mellitus)  Assessment and Plan of Treatment: - On Metformin 2000mg HS   - Trulicity weekly at home       Diagnosis: PCOS (polycystic ovarian syndrome)  Assessment and Plan of Treatment: - On OCP   - Metformin    Diagnosis: Hyponatremia  Assessment and Plan of Treatment: 132 -> 135  resolved   likely in setting of poor hydration     PRINCIPAL DISCHARGE DIAGNOSIS  Diagnosis: Right lower lobe pneumonia  Assessment and Plan of Treatment: - Legionella and mycoplasma PCR ordered, follow up   - off oxygen  - Incentive spirometry   - d/c with oupt abx      SECONDARY DISCHARGE DIAGNOSES  Diagnosis: Hypoxia  Assessment and Plan of Treatment:     Diagnosis: DM (diabetes mellitus)  Assessment and Plan of Treatment: - On Metformin 2000mg HS   - Trulicity weekly at home       Diagnosis: PCOS (polycystic ovarian syndrome)  Assessment and Plan of Treatment: - On OCP   - Metformin    Diagnosis: Hyponatremia  Assessment and Plan of Treatment: 132 -> 135  resolved   likely in setting of poor hydration

## 2024-09-11 NOTE — DIETITIAN INITIAL EVALUATION ADULT - PERTINENT MEDS FT
MEDICATIONS  (STANDING):  dextrose 5%. 1000 milliLiter(s) (50 mL/Hr) IV Continuous <Continuous>  dextrose 5%. 1000 milliLiter(s) (100 mL/Hr) IV Continuous <Continuous>  dextrose 50% Injectable 25 Gram(s) IV Push once  dextrose 50% Injectable 12.5 Gram(s) IV Push once  dextrose 50% Injectable 25 Gram(s) IV Push once  glucagon  Injectable 1 milliGRAM(s) IntraMuscular once  insulin lispro (ADMELOG) corrective regimen sliding scale   SubCutaneous three times a day before meals  insulin lispro (ADMELOG) corrective regimen sliding scale   SubCutaneous at bedtime

## 2024-09-11 NOTE — DIETITIAN INITIAL EVALUATION ADULT - ORAL INTAKE PTA/DIET HISTORY
Spoke with pt. Pt reports decreased appetite and PO intake <50% for 7 days PTA. Pt attributes the decreased appetite to an increase in BM (diarrhea) from a current antibiotic she was taking. Throughout hospital stay pt has seen improvement in appetite with PO intake  <% at meals. Patient endorses an 11 pound unintentional weight loss over the past week PTA. NFPE completed. No current N/V/D/C; last BM documented 9/10. Pt also wants to try and eat healthier after D/C. Reviewed consistent carbohydrate diet and encouraged HBV protein sources. Consult also received for [facility]; pt enrolled in program and community resources provided. Pt would like to  groceries this week for [facility] program; pt will call RD to schedule.

## 2024-09-11 NOTE — DISCHARGE NOTE PROVIDER - ATTENDING DISCHARGE PHYSICAL EXAMINATION:
CONSTITUTIONAL: No apparent distress  HEENT: Normocephalic, Atraumatic  RESPIRATORY:  lungs are clear to auscultation bilaterally, breath sounds equal bilaterally. No crackles, rhonchi, wheezes  CARDIOVASCULAR: Regular rate and rhythm, No lower extremity edema; Peripheral pulses are 2+ bilaterally  ABDOMEN: Soft, non-distended, nontender to palpation, +BS  MUSCLOSKELETAL: Normal gait; moving all 4 extremities spontaneously  PSYCH: thoughts linear, affect appropriate  NEUROLOGY: Alert, Oriented x3   CONSTITUTIONAL: No apparent distress  HEENT: Normocephalic, Atraumatic  RESPIRATORY:  lungs are clear to auscultation bilaterally, breath sounds equal bilaterally. No crackles, rhonchi, wheezes  CARDIOVASCULAR: Regular rate and rhythm, No lower extremity edema; Peripheral pulses are 2+ bilaterally  ABDOMEN: Soft, non-distended, nontender to palpation, +BS  MUSCLOSKELETAL: Normal gait; moving all 4 extremities spontaneously  PSYCH: thoughts linear, affect appropriate  : thick white discharge noted in vaginal canal   NEUROLOGY: Alert, Oriented x3

## 2024-09-11 NOTE — DISCHARGE NOTE PROVIDER - CARE PROVIDER_API CALL
Jennifer Man  [address] [place] [address]  Phone: [phone]  Fax: [phone]  Established Patient  Follow Up Time:

## 2024-09-11 NOTE — DISCHARGE NOTE PROVIDER - HOSPITAL COURSE
28yoF w/ PMHx of PCOS, lymphedema, DM-2 came to the ED complaining productive cough associated with shortness of breath with exertion and pleuritic chest pain x 1 week. In the ED, patient was hypoxic with ambulation. CTA chest done to rule out PE as patient is on OCP. Right middle/right lower lobe pneumonia suspected; small right pleural effusion; PE ruled out. U/A +, UCx growing pan sensitive e. coli. Patient walking without oxygen. Patient medically stable for d/c home w/ abx and outpt f/u. 28yoF w/ PMHx of PCOS, lymphedema, DM-2 came to the ED complaining productive cough associated with shortness of breath with exertion and pleuritic chest pain x 1 week. In the ED, patient was hypoxic with ambulation. CTA chest done to rule out PE as patient is on OCP. Right middle/right lower lobe pneumonia suspected; small right pleural effusion; PE ruled out. U/A +, UCx growing pan sensitive e. coli. Patient walking without oxygen. Patient also reports thick white vaginal discharge along with pruritus, given fluconazole 150mg x1 prior to d/c. Patient medically stable for d/c home w/ abx and outpt f/u.

## 2024-09-11 NOTE — DISCHARGE NOTE PROVIDER - NSDCMRMEDTOKEN_GEN_ALL_CORE_FT
cholecalciferol 1250 mcg (50,000 intl units) oral capsule: 1 cap(s) orally every 7 days  metFORMIN 500 mg oral tablet: 4 tab(s) orally once a day (at bedtime)  Trulicity Pen 3 mg/0.5 mL subcutaneous solution: 3 milligram(s) subcutaneously every 7 days   acetaminophen 325 mg oral tablet: 2 tab(s) orally every 6 hours As needed Temp greater or equal to 38C (100.4F), Mild Pain (1 - 3)  amoxicillin-clavulanate 875 mg-125 mg oral tablet: 875 milligram(s) orally 2 times a day  metFORMIN 500 mg oral tablet: 4 tab(s) orally once a day (at bedtime)  Trulicity Pen 3 mg/0.5 mL subcutaneous solution: 3 milligram(s) subcutaneously every 7 days

## 2024-09-11 NOTE — CHART NOTE - NSCHARTNOTEFT_GEN_A_CORE
*incomplete note     itching  -benadryl Notified by RN that patient reports generalized itching.  Patient states that she is allergy to strawberries and that she was possibly exposed to them by a visitor.  No rash, hives, difficulty breathing, swelling of face or throat.   -Benadryl 25mg po x1

## 2024-09-13 ENCOUNTER — APPOINTMENT (OUTPATIENT)
Dept: FAMILY MEDICINE | Facility: CLINIC | Age: 29
End: 2024-09-13
Payer: MEDICAID

## 2024-09-13 VITALS
HEIGHT: 72 IN | OXYGEN SATURATION: 97 % | SYSTOLIC BLOOD PRESSURE: 128 MMHG | TEMPERATURE: 96.9 F | DIASTOLIC BLOOD PRESSURE: 76 MMHG | HEART RATE: 81 BPM | WEIGHT: 293 LBS | BODY MASS INDEX: 39.68 KG/M2

## 2024-09-13 DIAGNOSIS — J18.9 PNEUMONIA, UNSPECIFIED ORGANISM: ICD-10-CM

## 2024-09-13 PROCEDURE — 99495 TRANSJ CARE MGMT MOD F2F 14D: CPT

## 2024-09-14 LAB
CULTURE RESULTS: SIGNIFICANT CHANGE UP
SPECIMEN SOURCE: SIGNIFICANT CHANGE UP

## 2024-09-15 NOTE — HISTORY OF PRESENT ILLNESS
[Post-hospitalization from ___ Hospital] : Post-hospitalization from [unfilled] Hospital [Admitted on: ___] : The patient was admitted on [unfilled] [Discharged on ___] : discharged on [unfilled] [Discharge Summary] : discharge summary [Radiology Findings] : radiology findings [Discharge Med List] : discharge medication list [Med Reconciliation] : medication reconciliation has been completed [Patient Contacted By: ____] : and contacted by [unfilled] [FreeTextEntry2] : Pt went to the ED complaining of productive cough associated with shortness of breath with exertion and pleuritic chest pain x 1 week. In the ED, patient was hypoxic with ambulation. CTA chest done to rule out PE as patient is on OCP. Right middle/right lower lobe pneumonia; small right pleural effusion; PE ruled out. U/A +, UCx growing pan sensitive e. coli. Patient also c/o thick white vaginal discharge along with pruritus, given fluconazole 150mg x1 prior to d/c. Patient was d/c home w/ augmentin x 7 days and outpt f/u.

## 2024-09-15 NOTE — PHYSICAL EXAM
[Normal] : normal rate, regular rhythm, normal S1 and S2 and no murmur heard [Soft] : abdomen soft [Non Tender] : non-tender [de-identified] : decreased BS R lung base [18712 - Moderate Complexity requires multiple possible diagnoses and/or the management options, moderate complexity of the medical data (tests, etc.) to be reviewed, and moderate risk of significant complications, morbidity, and/or mortality as well as co] : Moderate Complexity

## 2024-09-15 NOTE — PHYSICAL EXAM
[Normal] : normal rate, regular rhythm, normal S1 and S2 and no murmur heard [Soft] : abdomen soft [Non Tender] : non-tender [de-identified] : decreased BS R lung base [77522 - Moderate Complexity requires multiple possible diagnoses and/or the management options, moderate complexity of the medical data (tests, etc.) to be reviewed, and moderate risk of significant complications, morbidity, and/or mortality as well as co] : Moderate Complexity

## 2024-09-19 ENCOUNTER — NON-APPOINTMENT (OUTPATIENT)
Age: 29
End: 2024-09-19

## 2024-11-16 ENCOUNTER — EMERGENCY (EMERGENCY)
Facility: HOSPITAL | Age: 29
LOS: 1 days | Discharge: HOME | End: 2024-11-16
Attending: EMERGENCY MEDICINE
Payer: COMMERCIAL

## 2024-11-16 VITALS
RESPIRATION RATE: 20 BRPM | DIASTOLIC BLOOD PRESSURE: 86 MMHG | WEIGHT: 293 LBS | TEMPERATURE: 98 F | HEIGHT: 72 IN | OXYGEN SATURATION: 99 % | HEART RATE: 77 BPM | SYSTOLIC BLOOD PRESSURE: 123 MMHG

## 2024-11-16 PROCEDURE — 99284 EMERGENCY DEPT VISIT MOD MDM: CPT | Mod: 25

## 2024-11-17 LAB
FLUAV AG NPH QL: SIGNIFICANT CHANGE UP
FLUBV AG NPH QL: SIGNIFICANT CHANGE UP
RSV RNA NPH QL NAA+NON-PROBE: SIGNIFICANT CHANGE UP
SARS-COV-2 RNA SPEC QL NAA+PROBE: SIGNIFICANT CHANGE UP

## 2024-11-17 PROCEDURE — 99283 EMERGENCY DEPT VISIT LOW MDM: CPT

## 2024-11-17 PROCEDURE — 71046 X-RAY EXAM CHEST 2 VIEWS: CPT | Mod: 26

## 2024-11-17 PROCEDURE — 71046 X-RAY EXAM CHEST 2 VIEWS: CPT

## 2024-11-17 PROCEDURE — 87637 SARSCOV2&INF A&B&RSV AMP PRB: CPT

## 2024-11-17 RX ORDER — IBUPROFEN 200 MG
600 TABLET ORAL ONCE
Refills: 0 | Status: COMPLETED | OUTPATIENT
Start: 2024-11-17 | End: 2024-11-17

## 2024-11-17 RX ADMIN — Medication 600 MILLIGRAM(S): at 01:12

## 2024-11-27 ENCOUNTER — NON-APPOINTMENT (OUTPATIENT)
Age: 29
End: 2024-11-27

## 2024-12-13 ENCOUNTER — APPOINTMENT (OUTPATIENT)
Dept: FAMILY MEDICINE | Facility: CLINIC | Age: 29
End: 2024-12-13
Payer: MEDICAID

## 2024-12-13 VITALS
TEMPERATURE: 97.3 F | BODY MASS INDEX: 39.68 KG/M2 | SYSTOLIC BLOOD PRESSURE: 118 MMHG | DIASTOLIC BLOOD PRESSURE: 74 MMHG | WEIGHT: 293 LBS | HEART RATE: 83 BPM | HEIGHT: 72 IN | OXYGEN SATURATION: 99 %

## 2024-12-13 DIAGNOSIS — Z11.1 ENCOUNTER FOR SCREENING FOR RESPIRATORY TUBERCULOSIS: ICD-10-CM

## 2024-12-13 PROCEDURE — 99213 OFFICE O/P EST LOW 20 MIN: CPT

## 2024-12-16 ENCOUNTER — APPOINTMENT (OUTPATIENT)
Dept: FAMILY MEDICINE | Facility: CLINIC | Age: 29
End: 2024-12-16

## 2025-01-08 ENCOUNTER — APPOINTMENT (OUTPATIENT)
Dept: FAMILY MEDICINE | Facility: CLINIC | Age: 30
End: 2025-01-08

## 2025-01-16 LAB
M TB IFN-G BLD-IMP: NEGATIVE
QUANTIFERON TB PLUS MITOGEN MINUS NIL: >10 IU/ML
QUANTIFERON TB PLUS NIL: 0.08 IU/ML
QUANTIFERON TB PLUS TB1 MINUS NIL: 0 IU/ML
QUANTIFERON TB PLUS TB2 MINUS NIL: 0 IU/ML

## 2025-01-26 ENCOUNTER — NON-APPOINTMENT (OUTPATIENT)
Age: 30
End: 2025-01-26

## 2025-01-28 ENCOUNTER — APPOINTMENT (OUTPATIENT)
Dept: FAMILY MEDICINE | Facility: CLINIC | Age: 30
End: 2025-01-28

## 2025-01-28 VITALS
TEMPERATURE: 98.3 F | SYSTOLIC BLOOD PRESSURE: 114 MMHG | HEART RATE: 83 BPM | DIASTOLIC BLOOD PRESSURE: 80 MMHG | BODY MASS INDEX: 39.68 KG/M2 | WEIGHT: 293 LBS | OXYGEN SATURATION: 95 % | HEIGHT: 72 IN

## 2025-01-28 DIAGNOSIS — E66.01 MORBID (SEVERE) OBESITY DUE TO EXCESS CALORIES: ICD-10-CM

## 2025-01-28 DIAGNOSIS — J01.00 ACUTE MAXILLARY SINUSITIS, UNSPECIFIED: ICD-10-CM

## 2025-01-28 PROCEDURE — 99214 OFFICE O/P EST MOD 30 MIN: CPT

## 2025-01-28 RX ORDER — AZELASTINE HYDROCHLORIDE 137 UG/1
137 SPRAY, METERED NASAL TWICE DAILY
Qty: 1 | Refills: 1 | Status: ACTIVE | COMMUNITY
Start: 2025-01-28 | End: 1900-01-01

## 2025-01-28 RX ORDER — PHENTERMINE AND TOPIRAMATE 3.75; 23 MG/1; MG/1
3.75-23 CAPSULE, EXTENDED RELEASE ORAL
Qty: 30 | Refills: 0 | Status: ACTIVE | COMMUNITY
Start: 2025-01-28 | End: 1900-01-01

## 2025-02-07 NOTE — ED ADULT NURSE NOTE - NSIMPLEMENTINTERV_GEN_ALL_ED
(2) Male Implemented All Universal Safety Interventions:  [place] to call system. Call bell, personal items and telephone within reach. Instruct patient to call for assistance. Room bathroom lighting operational. Non-slip footwear when patient is off stretcher. Physically safe environment: no spills, clutter or unnecessary equipment. Stretcher in lowest position, wheels locked, appropriate side rails in place.

## 2025-02-10 ENCOUNTER — TRANSCRIPTION ENCOUNTER (OUTPATIENT)
Age: 30
End: 2025-02-10

## 2025-02-10 RX ORDER — TOPIRAMATE 25 MG/1
25 TABLET, FILM COATED ORAL
Qty: 180 | Refills: 0 | Status: ACTIVE | COMMUNITY
Start: 2025-02-10 | End: 1900-01-01

## 2025-02-10 RX ORDER — PHENTERMINE HYDROCHLORIDE 15 MG/1
15 CAPSULE ORAL
Qty: 30 | Refills: 0 | Status: ACTIVE | COMMUNITY
Start: 2025-02-10 | End: 1900-01-01

## 2025-02-24 NOTE — ED PROVIDER NOTE - RESPIRATORY NEGATIVE STATEMENT, MLM
[facility] Dermatology Note  Virtual Visit Details    Type of service:  Telephone Visit   Phone call duration: 9 minutes   Originating Location (pt. Location): Home    Distant Location (provider location):  On-site  Telephone visit completed due to n/a  Encounter Date: Feb 24, 2025  [employer]-and-Forward and Telephone. Location of teledermatologist: [facility] [place].  Start time: 3:11. End time: 3:20.    Dermatologic Surgery Telemedicine Consult Note    Dermatology Problem List:  # NMSC  - nBCC, L lower eyelid margin, s/p bx 02/05/25, pending MMS 02/26/25  # Predominantly intradermal melanocytic nevus, R buttock.  - s/p shave bx 05/30/2024  # Ruptured folliculitis, L nipple  - s/p punch bx 05/30/2024    # Family history of melanoma   - Sister with melanoma in situ of the face at age 41    CC: Consult For (L lower eyelid BCC)      Subjective: Heather Guillory is a 42 year old female who presents today for Mohs micrographic surgery consultation for a recent diagnosis of skin cancer.  - Skin cancer(s): BCC  - Location(s): Left lower eyelid margin  - This will be her first Mohs procedure. Pt states she is anxious about the procedure.  - no other concerns today       Objective:   Skin: Focused examination of the left lower eyelid margin within the teledermatology photograph(s) on 02/24/25 was performed.   - Pink translucent pearly papule on the left lower eyelid margin.  - See photo from bx date 02/05/25 below.      Path report:   Case: [id number]  Collected: 02/05/2025  Final Diagnosis   Left lower eyelid:  - Basal cell carcinoma, nodular type        Assessment and Plan:     1. Plan for Mohs micrographic surgery for skin cancer(s) above:  *Review lab result(s): Dermpath report   - We discussed the nature of the diagnosis/condition above. We discussed the treatment options, including the risks benefits and expectations of these options. We recommend micrographic  surgery as the most effective and most tissue sparing option for treatment, and the patient agrees to proceed with this.  The patient is aware of the risks, benefits and expectations of this procedure. The patient will be scheduled for this procedure, if not already done so.  - We anticipate the following closure type: Linear closure, such as complex linear closure (CLC)/pentagonal wedge  - Diazepam prescription sent to pharmacy today. Take on morning of Mohs surgery with breakfast.    The patient was discussed with and evaluated by attending physician, Huang Cintron MD and Savi Leblanc MD.    Staff Involved:  Staff/Scribe/Fellow    Scribe Disclosure:   I, Sheila Gregory, am serving as a scribe; to document services personally performed by Huang Cintron MD, based on data collection and the provider's statements to me.     Provider Disclosure:  I agree with the above history, review of systems, physical exam and plan.  I have reviewed the content of the documentation and have edited it as needed. I have personally performed the services documented here and the documentation accurately represents those services and the decisions I have made.      Electronically signed by:  Attending Attestation  I attest that I discussed the case with the Fellow.  I agree with the plan.   I have reviewed the note and edited it as necessary.    Huang Cintron M.D.  Professor  Director of Dermatologic Surgery  Department of Dermatology  [facility]      no chest pain, no cough, and no shortness of breath.

## 2025-03-10 ENCOUNTER — RX RENEWAL (OUTPATIENT)
Age: 30
End: 2025-03-10

## 2025-03-20 ENCOUNTER — APPOINTMENT (OUTPATIENT)
Dept: FAMILY MEDICINE | Facility: CLINIC | Age: 30
End: 2025-03-20

## 2025-03-29 ENCOUNTER — OFFICE (OUTPATIENT)
Dept: URBAN - METROPOLITAN AREA CLINIC 63 | Facility: CLINIC | Age: 30
Setting detail: OPHTHALMOLOGY
End: 2025-03-29
Payer: COMMERCIAL

## 2025-03-29 DIAGNOSIS — E11.9: ICD-10-CM

## 2025-03-29 DIAGNOSIS — H04.123: ICD-10-CM

## 2025-03-29 DIAGNOSIS — H43.393: ICD-10-CM

## 2025-03-29 PROCEDURE — 92250 FUNDUS PHOTOGRAPHY W/I&R: CPT | Performed by: STUDENT IN AN ORGANIZED HEALTH CARE EDUCATION/TRAINING PROGRAM

## 2025-03-29 PROCEDURE — 92014 COMPRE OPH EXAM EST PT 1/>: CPT | Performed by: STUDENT IN AN ORGANIZED HEALTH CARE EDUCATION/TRAINING PROGRAM

## 2025-03-29 ASSESSMENT — CONFRONTATIONAL VISUAL FIELD TEST (CVF)
OS_FINDINGS: FULL
OD_FINDINGS: FULL

## 2025-03-29 ASSESSMENT — TONOMETRY
OS_IOP_MMHG: 16
OD_IOP_MMHG: 16

## 2025-04-01 PROBLEM — H35.413 LATTICE DEGENERATION; BOTH EYES: Status: ACTIVE | Noted: 2025-03-29

## 2025-04-01 ASSESSMENT — REFRACTION_MANIFEST
OD_AXIS: 120
OU_VA: 20/15-
OD_VA1: 20/15
OS_VA1: 20/15
OS_CYLINDER: -1.50
OD_VA1: 20/20
OS_SPHERE: -4.00
OD_CYLINDER: -0.50
OD_AXIS: 005
OD_AXIS: 011
OS_VA1: 20/20
OD_SPHERE: -4.75
OD_VA1: 20/15
OU_VA: 20/15
OS_AXIS: 175
OD_SPHERE: -4.75
OD_SPHERE: -5.00
OD_CYLINDER: -0.25
OS_AXIS: 160
OS_VA1: 20/15-
OS_CYLINDER: -2.00
OS_AXIS: 163
OS_CYLINDER: -1.50
OS_SPHERE: -3.50
OS_SPHERE: -3.50
OU_VA: 20/20
OD_CYLINDER: -0.50

## 2025-04-01 ASSESSMENT — REFRACTION_CURRENTRX
OD_OVR_VA: 20/
OS_CYLINDER: -1.00
OS_SPHERE: -3.75
OD_AXIS: 003
OD_VPRISM_DIRECTION: SV
OS_AXIS: 171
OD_SPHERE: -5.50
OS_OVR_VA: 20/
OD_CYLINDER: -0.50
OS_VPRISM_DIRECTION: SV

## 2025-04-01 ASSESSMENT — KERATOMETRY
OD_K1POWER_DIOPTERS: 40.50
OD_AXISANGLE_DEGREES: 080
OD_K2POWER_DIOPTERS: 41.50
OS_AXISANGLE_DEGREES: 078
OS_K1POWER_DIOPTERS: 40.25
OS_K2POWER_DIOPTERS: 42.00

## 2025-04-01 ASSESSMENT — VISUAL ACUITY
OD_BCVA: 20/20-1
OS_BCVA: 20/20

## 2025-04-01 ASSESSMENT — REFRACTION_AUTOREFRACTION
OS_SPHERE: -4.00
OD_SPHERE: -5.00
OS_AXIS: 158
OD_CYLINDER: -0.25
OD_AXIS: 119
OS_CYLINDER: -1.50

## 2025-05-15 ENCOUNTER — RX RENEWAL (OUTPATIENT)
Age: 30
End: 2025-05-15

## 2025-06-16 ENCOUNTER — APPOINTMENT (OUTPATIENT)
Dept: FAMILY MEDICINE | Facility: CLINIC | Age: 30
End: 2025-06-16
Payer: MEDICAID

## 2025-06-16 VITALS
TEMPERATURE: 97.8 F | WEIGHT: 293 LBS | OXYGEN SATURATION: 98 % | HEART RATE: 74 BPM | SYSTOLIC BLOOD PRESSURE: 128 MMHG | BODY MASS INDEX: 39.68 KG/M2 | DIASTOLIC BLOOD PRESSURE: 86 MMHG | HEIGHT: 72 IN

## 2025-06-16 PROBLEM — Z87.440 HISTORY OF URINARY TRACT INFECTION: Status: RESOLVED | Noted: 2021-04-07 | Resolved: 2025-06-16

## 2025-06-16 PROBLEM — J18.9 RLL PNEUMONIA: Status: RESOLVED | Noted: 2024-09-13 | Resolved: 2025-06-16

## 2025-06-16 PROBLEM — Z11.1 PPD SCREENING TEST: Status: RESOLVED | Noted: 2019-09-27 | Resolved: 2025-06-16

## 2025-06-16 PROBLEM — Z86.2 HISTORY OF IRON DEFICIENCY ANEMIA: Status: RESOLVED | Noted: 2019-01-18 | Resolved: 2025-06-16

## 2025-06-16 PROBLEM — R50.9 FEBRILE ILLNESS: Status: RESOLVED | Noted: 2021-04-06 | Resolved: 2025-06-16

## 2025-06-16 PROBLEM — H61.22 IMPACTED CERUMEN OF LEFT EAR: Status: RESOLVED | Noted: 2018-01-25 | Resolved: 2025-06-16

## 2025-06-16 PROBLEM — E55.9 VITAMIN D INSUFFICIENCY: Status: RESOLVED | Noted: 2023-06-08 | Resolved: 2025-06-16

## 2025-06-16 PROBLEM — Z87.898 HISTORY OF FATIGUE: Status: RESOLVED | Noted: 2020-02-24 | Resolved: 2025-06-16

## 2025-06-16 PROBLEM — Z13.0 SCREENING, ANEMIA, DEFICIENCY, IRON: Status: RESOLVED | Noted: 2018-01-25 | Resolved: 2025-06-16

## 2025-06-16 PROBLEM — N20.0 NEPHROLITHIASIS: Status: ACTIVE | Noted: 2025-06-16

## 2025-06-16 PROBLEM — F51.02 TRANSIENT INSOMNIA: Status: RESOLVED | Noted: 2023-06-08 | Resolved: 2025-06-16

## 2025-06-16 PROCEDURE — 99213 OFFICE O/P EST LOW 20 MIN: CPT | Mod: 25

## 2025-06-16 PROCEDURE — 99395 PREV VISIT EST AGE 18-39: CPT

## 2025-06-18 ENCOUNTER — LABORATORY RESULT (OUTPATIENT)
Age: 30
End: 2025-06-18

## 2025-06-19 PROBLEM — R82.90 ABNORMAL URINALYSIS: Status: ACTIVE | Noted: 2025-06-19

## 2025-06-19 LAB
ALBUMIN SERPL ELPH-MCNC: 4.3 G/DL
ALP BLD-CCNC: 98 U/L
ALT SERPL-CCNC: 32 U/L
ANION GAP SERPL CALC-SCNC: 13 MMOL/L
APPEARANCE: CLEAR
AST SERPL-CCNC: 30 U/L
BASOPHILS # BLD AUTO: 0.03 K/UL
BASOPHILS NFR BLD AUTO: 0.5 %
BILIRUB SERPL-MCNC: 0.4 MG/DL
BILIRUBIN URINE: NEGATIVE
BLOOD URINE: NEGATIVE
BUN SERPL-MCNC: 9 MG/DL
CALCIUM SERPL-MCNC: 9.7 MG/DL
CHLORIDE SERPL-SCNC: 103 MMOL/L
CHOLEST SERPL-MCNC: 171 MG/DL
CO2 SERPL-SCNC: 22 MMOL/L
COLOR: YELLOW
CREAT SERPL-MCNC: 0.95 MG/DL
CREAT SPEC-SCNC: 222 MG/DL
EGFRCR SERPLBLD CKD-EPI 2021: 83 ML/MIN/1.73M2
EOSINOPHIL # BLD AUTO: 0.02 K/UL
EOSINOPHIL NFR BLD AUTO: 0.3 %
ESTIMATED AVERAGE GLUCOSE: 94 MG/DL
FERRITIN SERPL-MCNC: 116 NG/ML
GLUCOSE QUALITATIVE U: NEGATIVE MG/DL
GLUCOSE SERPL-MCNC: 70 MG/DL
HBA1C MFR BLD HPLC: 4.9 %
HCT VFR BLD CALC: 39.1 %
HDLC SERPL-MCNC: 62 MG/DL
HGB BLD-MCNC: 12.7 G/DL
IMM GRANULOCYTES NFR BLD AUTO: 0.5 %
IRON SATN MFR SERPL: 17 %
IRON SERPL-MCNC: 60 UG/DL
KETONES URINE: ABNORMAL MG/DL
LDLC SERPL-MCNC: 92 MG/DL
LEUKOCYTE ESTERASE URINE: ABNORMAL
LYMPHOCYTES # BLD AUTO: 1.97 K/UL
LYMPHOCYTES NFR BLD AUTO: 30.7 %
MAN DIFF?: NORMAL
MCHC RBC-ENTMCNC: 32 PG
MCHC RBC-ENTMCNC: 32.5 G/DL
MCV RBC AUTO: 98.5 FL
MICROALBUMIN 24H UR DL<=1MG/L-MCNC: 9.5 MG/DL
MICROALBUMIN/CREAT 24H UR-RTO: 43 MG/G
MONOCYTES # BLD AUTO: 0.32 K/UL
MONOCYTES NFR BLD AUTO: 5 %
NEUTROPHILS # BLD AUTO: 4.04 K/UL
NEUTROPHILS NFR BLD AUTO: 63 %
NITRITE URINE: POSITIVE
NONHDLC SERPL-MCNC: 109 MG/DL
PH URINE: 6.5
PLATELET # BLD AUTO: 269 K/UL
POTASSIUM SERPL-SCNC: 4.5 MMOL/L
PROT SERPL-MCNC: 7.9 G/DL
PROTEIN URINE: 30 MG/DL
RBC # BLD: 3.97 M/UL
RBC # FLD: 12.6 %
SODIUM SERPL-SCNC: 138 MMOL/L
SPECIFIC GRAVITY URINE: 1.02
T4 SERPL-MCNC: 9.6 UG/DL
TIBC SERPL-MCNC: 346 UG/DL
TRIGL SERPL-MCNC: 94 MG/DL
TSH SERPL-ACNC: 3.31 UIU/ML
UIBC SERPL-MCNC: 286 UG/DL
UROBILINOGEN URINE: 0.2 MG/DL
WBC # FLD AUTO: 6.41 K/UL

## 2025-06-28 LAB — BACTERIA UR CULT: ABNORMAL

## 2025-06-30 PROBLEM — R39.9 UTI SYMPTOMS: Status: ACTIVE | Noted: 2025-06-30

## 2025-06-30 RX ORDER — SULFAMETHOXAZOLE AND TRIMETHOPRIM 800; 160 MG/1; MG/1
800-160 TABLET ORAL TWICE DAILY
Qty: 16 | Refills: 0 | Status: ACTIVE | COMMUNITY
Start: 2025-06-30 | End: 1900-01-01

## 2025-07-07 LAB
M TB IFN-G BLD-IMP: NEGATIVE
QUANTIFERON TB PLUS MITOGEN MINUS NIL: >10 IU/ML
QUANTIFERON TB PLUS NIL: 0.03 IU/ML
QUANTIFERON TB PLUS TB1 MINUS NIL: 0 IU/ML
QUANTIFERON TB PLUS TB2 MINUS NIL: 0 IU/ML

## 2025-07-14 ENCOUNTER — EMERGENCY (EMERGENCY)
Facility: HOSPITAL | Age: 30
LOS: 1 days | End: 2025-07-14
Attending: EMERGENCY MEDICINE
Payer: COMMERCIAL

## 2025-07-14 VITALS
SYSTOLIC BLOOD PRESSURE: 108 MMHG | TEMPERATURE: 97 F | HEART RATE: 105 BPM | DIASTOLIC BLOOD PRESSURE: 73 MMHG | OXYGEN SATURATION: 96 % | WEIGHT: 293 LBS | RESPIRATION RATE: 16 BRPM

## 2025-07-14 VITALS
RESPIRATION RATE: 16 BRPM | SYSTOLIC BLOOD PRESSURE: 122 MMHG | DIASTOLIC BLOOD PRESSURE: 80 MMHG | OXYGEN SATURATION: 95 % | HEART RATE: 80 BPM

## 2025-07-14 LAB
ALBUMIN SERPL ELPH-MCNC: 4 G/DL — SIGNIFICANT CHANGE UP (ref 3.3–5.2)
ALP SERPL-CCNC: 98 U/L — SIGNIFICANT CHANGE UP (ref 40–120)
ALT FLD-CCNC: 26 U/L — SIGNIFICANT CHANGE UP
ANION GAP SERPL CALC-SCNC: 11 MMOL/L — SIGNIFICANT CHANGE UP (ref 5–17)
AST SERPL-CCNC: 25 U/L — SIGNIFICANT CHANGE UP
BASOPHILS # BLD AUTO: 0.03 K/UL — SIGNIFICANT CHANGE UP (ref 0–0.2)
BASOPHILS NFR BLD AUTO: 0.3 % — SIGNIFICANT CHANGE UP (ref 0–2)
BILIRUB SERPL-MCNC: 0.4 MG/DL — SIGNIFICANT CHANGE UP (ref 0.4–2)
BUN SERPL-MCNC: 8.8 MG/DL — SIGNIFICANT CHANGE UP (ref 8–20)
CALCIUM SERPL-MCNC: 9.4 MG/DL — SIGNIFICANT CHANGE UP (ref 8.4–10.5)
CHLORIDE SERPL-SCNC: 101 MMOL/L — SIGNIFICANT CHANGE UP (ref 96–108)
CO2 SERPL-SCNC: 23 MMOL/L — SIGNIFICANT CHANGE UP (ref 22–29)
CREAT SERPL-MCNC: 0.95 MG/DL — SIGNIFICANT CHANGE UP (ref 0.5–1.3)
EGFR: 83 ML/MIN/1.73M2 — SIGNIFICANT CHANGE UP
EGFR: 83 ML/MIN/1.73M2 — SIGNIFICANT CHANGE UP
EOSINOPHIL # BLD AUTO: 0.01 K/UL — SIGNIFICANT CHANGE UP (ref 0–0.5)
EOSINOPHIL NFR BLD AUTO: 0.1 % — SIGNIFICANT CHANGE UP (ref 0–6)
GLUCOSE SERPL-MCNC: 106 MG/DL — HIGH (ref 70–99)
HCG SERPL-ACNC: <4 MIU/ML — SIGNIFICANT CHANGE UP
HCT VFR BLD CALC: 40.2 % — SIGNIFICANT CHANGE UP (ref 34.5–45)
HGB BLD-MCNC: 13.1 G/DL — SIGNIFICANT CHANGE UP (ref 11.5–15.5)
IMM GRANULOCYTES # BLD AUTO: 0.06 K/UL — SIGNIFICANT CHANGE UP (ref 0–0.07)
IMM GRANULOCYTES NFR BLD AUTO: 0.6 % — SIGNIFICANT CHANGE UP (ref 0–0.9)
LIDOCAIN IGE QN: 34 U/L — SIGNIFICANT CHANGE UP (ref 22–51)
LYMPHOCYTES # BLD AUTO: 0.88 K/UL — LOW (ref 1–3.3)
LYMPHOCYTES NFR BLD AUTO: 8.5 % — LOW (ref 13–44)
MCHC RBC-ENTMCNC: 31.4 PG — SIGNIFICANT CHANGE UP (ref 27–34)
MCHC RBC-ENTMCNC: 32.6 G/DL — SIGNIFICANT CHANGE UP (ref 32–36)
MCV RBC AUTO: 96.4 FL — SIGNIFICANT CHANGE UP (ref 80–100)
MONOCYTES # BLD AUTO: 0.45 K/UL — SIGNIFICANT CHANGE UP (ref 0–0.9)
MONOCYTES NFR BLD AUTO: 4.3 % — SIGNIFICANT CHANGE UP (ref 2–14)
NEUTROPHILS # BLD AUTO: 8.94 K/UL — HIGH (ref 1.8–7.4)
NEUTROPHILS NFR BLD AUTO: 86.2 % — HIGH (ref 43–77)
NRBC # BLD AUTO: 0 K/UL — SIGNIFICANT CHANGE UP (ref 0–0)
NRBC # FLD: 0 K/UL — SIGNIFICANT CHANGE UP (ref 0–0)
NRBC BLD AUTO-RTO: 0 /100 WBCS — SIGNIFICANT CHANGE UP (ref 0–0)
PLATELET # BLD AUTO: 240 K/UL — SIGNIFICANT CHANGE UP (ref 150–400)
PMV BLD: 9.4 FL — SIGNIFICANT CHANGE UP (ref 7–13)
POTASSIUM SERPL-MCNC: 4.4 MMOL/L — SIGNIFICANT CHANGE UP (ref 3.5–5.3)
POTASSIUM SERPL-SCNC: 4.4 MMOL/L — SIGNIFICANT CHANGE UP (ref 3.5–5.3)
PROT SERPL-MCNC: 8 G/DL — SIGNIFICANT CHANGE UP (ref 6.6–8.7)
RBC # BLD: 4.17 M/UL — SIGNIFICANT CHANGE UP (ref 3.8–5.2)
RBC # FLD: 12.1 % — SIGNIFICANT CHANGE UP (ref 10.3–14.5)
SODIUM SERPL-SCNC: 135 MMOL/L — SIGNIFICANT CHANGE UP (ref 135–145)
WBC # BLD: 10.37 K/UL — SIGNIFICANT CHANGE UP (ref 3.8–10.5)
WBC # FLD AUTO: 10.37 K/UL — SIGNIFICANT CHANGE UP (ref 3.8–10.5)

## 2025-07-14 PROCEDURE — 99284 EMERGENCY DEPT VISIT MOD MDM: CPT

## 2025-07-14 PROCEDURE — 83690 ASSAY OF LIPASE: CPT

## 2025-07-14 PROCEDURE — 80053 COMPREHEN METABOLIC PANEL: CPT

## 2025-07-14 PROCEDURE — 85025 COMPLETE CBC W/AUTO DIFF WBC: CPT

## 2025-07-14 PROCEDURE — 96375 TX/PRO/DX INJ NEW DRUG ADDON: CPT

## 2025-07-14 PROCEDURE — 84702 CHORIONIC GONADOTROPIN TEST: CPT

## 2025-07-14 PROCEDURE — 36415 COLL VENOUS BLD VENIPUNCTURE: CPT

## 2025-07-14 PROCEDURE — 96374 THER/PROPH/DIAG INJ IV PUSH: CPT

## 2025-07-14 PROCEDURE — 99284 EMERGENCY DEPT VISIT MOD MDM: CPT | Mod: 25

## 2025-07-14 RX ORDER — ONDANSETRON HCL/PF 4 MG/2 ML
4 VIAL (ML) INJECTION ONCE
Refills: 0 | Status: COMPLETED | OUTPATIENT
Start: 2025-07-14 | End: 2025-07-14

## 2025-07-14 RX ORDER — ONDANSETRON HCL/PF 4 MG/2 ML
1 VIAL (ML) INJECTION
Qty: 9 | Refills: 0
Start: 2025-07-14 | End: 2025-07-16

## 2025-07-14 RX ORDER — ACETAMINOPHEN 500 MG/5ML
1000 LIQUID (ML) ORAL ONCE
Refills: 0 | Status: COMPLETED | OUTPATIENT
Start: 2025-07-14 | End: 2025-07-14

## 2025-07-14 RX ADMIN — Medication 1000 MILLILITER(S): at 18:20

## 2025-07-14 RX ADMIN — Medication 4 MILLIGRAM(S): at 15:11

## 2025-07-14 RX ADMIN — Medication 1000 MILLILITER(S): at 15:10

## 2025-07-14 RX ADMIN — Medication 20 MILLIGRAM(S): at 15:11

## 2025-07-14 RX ADMIN — Medication 400 MILLIGRAM(S): at 15:10

## 2025-07-14 NOTE — ED PROVIDER NOTE - PHYSICAL EXAMINATION
General: well appearing, NAD, non toxic   HEENT: NCAT   Eyes: clear b/l  Neck: supple   Cardiac: +tachycardia   Respiratory: No Respiratory Distress, Lungs CTA b/l, no wheezing, no rales, no rhonchi   Abdomen: soft, non tender, no rebound, no guarding, no CVA tenderness   Nuero: A & O x 3 , no focal deficits   Extremities: no edema, + FROM

## 2025-07-14 NOTE — ED PROVIDER NOTE - OBJECTIVE STATEMENT
29-year-old female history of non-insulin-dependent diabetes presents emergency department complaining of nausea, vomiting, epigastric pain, diarrhea, and headache.  Patient reports symptoms started last night.  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  Patient also reports a mild headache.  Denies any fever, chills, chest pain, shortness of breath, palpitations, syncope, dysuria, hematuria, flank pain or back pain.

## 2025-07-14 NOTE — ED ADULT TRIAGE NOTE - CHIEF COMPLAINT QUOTE
BIBEMS c/o N/V, abdominal cramping, head ache, body pain and weakness. Pr reports that she began to feel sick last night and began vomiting. States that her emesis is now "bile, with a little drop of blood."

## 2025-07-14 NOTE — ED ADULT NURSE NOTE - EXTENSIONS OF SELF_ADULT
Dr Pedro Koroma    Patient seen at [facility] ER last night for headaches  CT scan , EKG and chest xray were negative  Should patient be having MRI ? She is still having back pain and headaches  Please advise  None

## 2025-07-14 NOTE — ED PROVIDER NOTE - PATIENT PORTAL LINK FT
You can access the FollowMyHealth Patient Portal offered by [facility] by registering at the following website: [email or web address]. By joining [facility]’s FollowMyHealth portal, you will also be able to view your health information using other applications (apps) compatible with our system.

## 2025-07-14 NOTE — ED ADULT NURSE NOTE - OBJECTIVE STATEMENT
assumed care of pt at 1450. c/o generalized abd pain, body aches and weakness since last night. denies urinary sym or fevers. rr even and unlabored. anox4. pt educated on plan of care, pt able to successfully teach back plan of care to RN, RN will continue to reeducate pt during hospital stay.

## 2025-07-14 NOTE — ED PROVIDER NOTE - ATTENDING APP SHARED VISIT CONTRIBUTION OF CARE
29-year-old female history pre-dm presents emergency department complaining of nausea, vomiting, epigastric pain, diarrhea, and headache.  Patient reports symptoms started last night.  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  Patient also reports a mild headache.  Denies f/c/cp/sob/palpitations/cough/rash/dizziness/c/dysuria/hematuria. no sick contacts no recent travel.    Head: atraumatic, normacephalic  Face: atraumatic, no crepitus no orbiral/maxillary/mandibular ttp  throat: uvula midline no exudates  eyes: perrla eomi  heart: rrr s1s2  lungs: ctab  abd: soft, nt nd +bs no rebound/guarding no cva ttp  skin: warm  LE: no swelling, no calf ttp  back: no midline cervical/thoracic/lumbar ttp  neuro: aaox 3 no focal neuro deficit    abd benign most likely viral gastroenteritis, well appearing will check labs hydrate gi cocktail po challenge reassess

## 2025-07-14 NOTE — ED PROVIDER NOTE - CLINICAL SUMMARY MEDICAL DECISION MAKING FREE TEXT BOX
29-year-old female presents emergency department with nausea, vomiting, abdominal pain, and diarrhea.  Abdomen soft nontender no rebound no guarding on exam.  Labs are obtained show no leukocytosis, normal H&H, normal CMP other than a slightly elevated glucose at 106.  Patient feeling better after medications in the ED and tolerating p.o.  Will discharge outpatient follow-up ED return precautions discussed

## 2025-07-14 NOTE — ED PROVIDER NOTE - NSFOLLOWUPINSTRUCTIONS_ED_ALL_ED_FT
Nausea / Vomiting    Nausea is the feeling that you have to vomit. As nausea gets worse, it can lead to vomiting. Vomiting puts you at an increased risk for dehydration. Older adults and people with other diseases or a weak immune system are at higher risk for dehydration. Drink clear fluids in small but frequent amounts as tolerated. Eat bland, easy-to-digest foods in small amounts as tolerated.    SEEK IMMEDIATE MEDICAL CARE IF YOU HAVE ANY OF THE FOLLOWING SYMPTOMS: fever, inability to keep sufficient fluids down, black or bloody vomitus, black or bloody stools, lightheadedness/dizziness, chest pain, severe headache, rash, shortness of breath, cold or clammy skin, confusion, pain with urination, or any signs of dehydration.     Please follow-up with your doctor in 48 hours

## 2025-08-11 ENCOUNTER — APPOINTMENT (OUTPATIENT)
Dept: FAMILY MEDICINE | Facility: CLINIC | Age: 30
End: 2025-08-11